# Patient Record
Sex: FEMALE | Race: WHITE | NOT HISPANIC OR LATINO | Employment: OTHER
[De-identification: names, ages, dates, MRNs, and addresses within clinical notes are randomized per-mention and may not be internally consistent; named-entity substitution may affect disease eponyms.]

---

## 2017-06-03 ENCOUNTER — HEALTH MAINTENANCE LETTER (OUTPATIENT)
Age: 62
End: 2017-06-03

## 2017-09-08 ENCOUNTER — OFFICE VISIT (OUTPATIENT)
Dept: OBGYN | Facility: CLINIC | Age: 62
End: 2017-09-08
Payer: COMMERCIAL

## 2017-09-08 VITALS
WEIGHT: 205 LBS | SYSTOLIC BLOOD PRESSURE: 120 MMHG | HEIGHT: 65 IN | DIASTOLIC BLOOD PRESSURE: 84 MMHG | BODY MASS INDEX: 34.16 KG/M2

## 2017-09-08 DIAGNOSIS — Z01.419 ENCOUNTER FOR GYNECOLOGICAL EXAMINATION WITHOUT ABNORMAL FINDING: Primary | ICD-10-CM

## 2017-09-08 DIAGNOSIS — Z23 NEED FOR PROPHYLACTIC VACCINATION AND INOCULATION AGAINST INFLUENZA: ICD-10-CM

## 2017-09-08 DIAGNOSIS — Z85.42 HISTORY OF ENDOMETRIAL CANCER: ICD-10-CM

## 2017-09-08 PROCEDURE — 90686 IIV4 VACC NO PRSV 0.5 ML IM: CPT | Performed by: OBSTETRICS & GYNECOLOGY

## 2017-09-08 PROCEDURE — 90471 IMMUNIZATION ADMIN: CPT | Performed by: OBSTETRICS & GYNECOLOGY

## 2017-09-08 PROCEDURE — 99396 PREV VISIT EST AGE 40-64: CPT | Mod: 25 | Performed by: OBSTETRICS & GYNECOLOGY

## 2017-09-08 RX ORDER — POTASSIUM CHLORIDE 1500 MG/1
20 TABLET, EXTENDED RELEASE ORAL
COMMUNITY
Start: 2016-03-09

## 2017-09-08 ASSESSMENT — ANXIETY QUESTIONNAIRES
7. FEELING AFRAID AS IF SOMETHING AWFUL MIGHT HAPPEN: NOT AT ALL
6. BECOMING EASILY ANNOYED OR IRRITABLE: NOT AT ALL
1. FEELING NERVOUS, ANXIOUS, OR ON EDGE: NOT AT ALL
IF YOU CHECKED OFF ANY PROBLEMS ON THIS QUESTIONNAIRE, HOW DIFFICULT HAVE THESE PROBLEMS MADE IT FOR YOU TO DO YOUR WORK, TAKE CARE OF THINGS AT HOME, OR GET ALONG WITH OTHER PEOPLE: NOT DIFFICULT AT ALL
2. NOT BEING ABLE TO STOP OR CONTROL WORRYING: NOT AT ALL
GAD7 TOTAL SCORE: 0
5. BEING SO RESTLESS THAT IT IS HARD TO SIT STILL: NOT AT ALL
3. WORRYING TOO MUCH ABOUT DIFFERENT THINGS: NOT AT ALL

## 2017-09-08 ASSESSMENT — PATIENT HEALTH QUESTIONNAIRE - PHQ9
5. POOR APPETITE OR OVEREATING: NOT AT ALL
SUM OF ALL RESPONSES TO PHQ QUESTIONS 1-9: 0

## 2017-09-08 NOTE — PROGRESS NOTES
Farhana is a 62 year old  female who presents for annual exam.     Besides routine health maintenance, she has no other health concerns today .    HPI:  The patient's PCP is FREEDOM NICOLAS MD  Patient has a PCP that she is following up with for her lipids and her HTN  Continues to gain weight and is up about 10-15# in the last 1-2 years. Knows she needs to watch her diet and exercise but just can't seem to and struggles. 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    Had endometrial cancer a few years back. Last pap was almost 5 yrs ago. Wasn't sure if needed paps anymore. Not seeing onc anymore as they released her from their care a couple years ago. Had abnormal paps in her 20s but CIN1-2 and had a cone then with normal paps since. Has not had any vaginal bleeding, abnormal d/c or vaginal complaints at all. No urinary sx. Patient's mom with serous endometrial cancer in her 80s. No genetic testing done. No colon cancer on maternal side but did in pgm.     for years. X is now with a male partner and they are amicable. 2 children. Daughter has 2 boys. Son is (?kids)    dexa about 3-4 years ago with normal T scores.      GYNECOLOGIC HISTORY:    No LMP recorded. Patient has had a hysterectomy.  Her current contraception method is: hysterectomy.  She  reports that she has never smoked. She does not have any smokeless tobacco history on file.      Patient is sexually active.  STD testing offered?  Declined  Last PHQ-9 score on record =   PHQ-9 SCORE 2017   Total Score 0     Last GAD7 score on record =   BOO-7 SCORE 2017   Total Score 0     Alcohol Score = 2    HEALTH MAINTENANCE:  Cholesterol: Done at PCP 17:    262,150, 56, 4.68, 176, 206  Last Mammo:10/2016  Result: normal, Next Mammo: october  Pap: 13 WNL HPV Neg  Colonoscopy:  1/10/13, Result: normal, Next Colonoscopy:   Dexa: 14 T scores -0.6 of hips and spine    Health  maintenance updated:  yes    HISTORY:  Obstetric History       T2      L2     SAB0   TAB0   Ectopic0   Multiple0   Live Births0       # Outcome Date GA Lbr Doe/2nd Weight Sex Delivery Anes PTL Lv   2 Term            1 Term                   Patient Active Problem List   Diagnosis     History of endometrial cancer     Hypertension, essential     Gastroesophageal reflux disease, esophagitis presence not specified     Past Surgical History:   Procedure Laterality Date     Basal Cell carcinoma of skin excision       COLONOSCOPY  1/10/2013    Procedure: COLONOSCOPY;  COLONOSCOPY;  Surgeon: Heather Brown MD;  Location:  GI     CONIZATION      ROSENDO I or II     HCL SQUAMOUS CELL CARCINOMA AG      eyelid     HYSTERECTOMY  07      Social History   Substance Use Topics     Smoking status: Never Smoker     Smokeless tobacco: Not on file     Alcohol use No      Problem (# of Occurrences) Relation (Name,Age of Onset)    Alzheimer Disease (1) Father    Colon Cancer (1) Paternal Grandmother: rectal    Coronary Artery Disease (1) Mother: Afib    Fractures (1) Maternal Grandmother: hip    Gynecology (1) Mother    Hyperlipidemia (2) Mother, Father    Hypertension (2) Mother, Brother    OSTEOPOROSIS (1) Maternal Grandmother    Uterine Cancer (1) Mother (83): serous type            Current Outpatient Prescriptions   Medication Sig     potassium chloride SA (K-DUR/KLOR-CON M) 20 MEQ CR tablet Take 20 mEq by mouth     omeprazole (PRILOSEC) 20 MG capsule Take 1 capsule (20 mg) by mouth daily     triamterene-hydrochlorothiazide (MAXZIDE-25) 37.5-25 MG per tablet Take 1 tablet by mouth daily.     LISINOPRIL PO Take 2.5 mg by mouth.     No current facility-administered medications for this visit.      Allergies   Allergen Reactions     Septra [Sulfamethoxazole W/Trimethoprim]      Sulfa Drugs Hives       Past medical, surgical, social and family histories were reviewed and updated in EPIC.    ROS:   12  "point review of systems negative other than symptoms noted below.  Musculoskeletal: Joint Pain    EXAM:  /84  Ht 5' 4.5\" (1.638 m)  Wt 205 lb (93 kg)  BMI 34.64 kg/m2   BMI: Body mass index is 34.64 kg/(m^2).    PHYSICAL EXAM:  Constitutional:  Appearance: Well nourished, well developed, alert, in no acute distress  Neck:  Lymph Nodes:  No lymphadenopathy present    Thyroid:  Gland size normal, nontender, no nodules or masses present  on palpation  Chest:  Respiratory Effort:  Breathing unlabored  Cardiovascular:    Heart: Auscultation:  Regular rate, normal rhythm, no murmurs present  Breasts: Palpation of Breasts and Axillae:  No masses present on palpation, no breast tenderness. and No nodularity, asymmetry or nipple discharge bilaterally.  Gastrointestinal:   Abdominal Examination:  Abdomen nontender to palpation, tone normal without rigidity or guarding, no masses present, umbilicus without lesions   Liver and Spleen:  No hepatomegaly present, liver nontender to palpation    Hernias:  No hernias present  Lymphatic: Lymph Nodes:  No other lymphadenopathy present  Skin:  General Inspection:  No rashes present, no lesions present, no areas of  discoloration    Genitalia and Groin:  No rashes present, no lesions present, no areas of  discoloration, no masses present  Neurologic/Psychiatric:    Mental Status:  Oriented X3     Pelvic Exam:  External Genitalia:     Normal appearance for age, no discharge present, no tenderness present, no inflammatory lesions present, color normal  Vagina:     Normal vaginal vault without central or paravaginal defects, no discharge present, no inflammatory lesions present, no masses present  Bladder:     Nontender to palpation  Urethra:   Urethral Body:  Urethra palpation normal, urethra structural support normal   Urethral Meatus:  No erythema or lesions present  Cervix:     Surgically absent  Uterus:     Surgically absent  Adnexa:     Surgically absent  Perineum:  "    Perineum within normal limits, no evidence of trauma, no rashes or skin lesions present  Anus:     Anus within normal limits, no hemorrhoids present  Inguinal Lymph Nodes:     No lymphadenopathy present      COUNSELING:   Reviewed preventive health counseling, as reflected in patient instructions    BMI: Body mass index is 34.64 kg/(m^2).  Weight management plan: Discussed healthy diet and exercise guidelines and patient will follow up in 12 months in clinic to re-evaluate. Patient was referred to their PCP to discuss a diet and exercise plan.    ASSESSMENT:  62 year old female with satisfactory annual exam.    ICD-10-CM    1. Encounter for gynecological examination without abnormal finding Z01.419 Vaccine Administration, Initial [34955]   2. History of endometrial cancer Z85.42    3. Need for prophylactic vaccination and inoculation against influenza Z23 FLU VAC, SPLIT VIRUS IM > 3 YO (QUADRIVALENT) [43964]     Vaccine Administration, Initial [77337]       PLAN:  Pap is likely no longer indicated though oncology can have different opinions on this and at times due still recommend a pap as endometrial cancer recurrence is most likely at the cuff. Will likely repeat one more pap next year at 7 yrs and then just proceed with normal pelvic exam/spec exam for palpation and visualization w/o paps.  Patient is getting mammo in October. Encouraged to do at  [facility] [place] at time of our appointment so can do it through us and she will do that next year.  Dexa not needed for another several years based on scores.  Encouraged diet, exericse, weight loss.  Flu shot today    Yvette Bentley MD  Injectable Influenza Immunization Documentation    1.  Are you sick today? (Fever of 100.5 or higher on the day of the clinic)   No    2.  Have you ever had Guillain-[place] Syndrome within 6 weeks of an influenza vaccionation?  No    3. Do you have a life-threatening allergy to eggs?  No    4. Do you have a life-threatening allergy to  a component of the vaccine? May include antibiotics, gelatin or latex.  No     5. Have you ever had a reaction to a dose of flu vaccine that needed immediate medical attention?  No     Form completed by Dorene Stevens MA

## 2017-09-08 NOTE — MR AVS SNAPSHOT
"              After Visit Summary   9/8/2017    Farhana Michael    MRN: [id number]           Patient Information     Date Of Birth          1955        Visit Information        Provider Department      9/8/2017 10:50 AM Yvette Bentley MD [facility]        Today's Diagnoses     Encounter for gynecological examination without abnormal finding    -  1    History of endometrial cancer        Need for prophylactic vaccination and inoculation against influenza           Follow-ups after your visit        Who to contact     If you have questions or need follow up information about today's clinic visit or your schedule please contact [facility] directly at [phone].  Normal or non-critical lab and imaging results will be communicated to you by MyFront[employer]ps[employer], letter or phone within 4 business days after the clinic has received the results. If you do not hear from us within 7 days, please contact the clinic through [employer] or phone. If you have a critical or abnormal lab result, we will notify you by phone as soon as possible.  Submit refill requests through [employer] or call your pharmacy and they will forward the refill request to us. Please allow 3 business days for your refill to be completed.          Additional Information About Your Visit        MyChart Information     [employer] gives you secure access to your electronic health record. If you see a primary care provider, you can also send messages to your care team and make appointments. If you have questions, please call your primary care clinic.  If you do not have a primary care provider, please call [phone] and they will assist you.        Care EveryWhere ID     This is your Care EveryWhere ID. This could be used by other organizations to access your [facility] medical records  [id number]        Your Vitals Were     Height BMI (Body Mass Index)                5' 4.5\" (1.638 m) 34.64 kg/m2           Blood Pressure from " Last 3 Encounters:   09/08/17 120/84   03/02/16 122/84   03/21/14 120/82    Weight from Last 3 Encounters:   09/08/17 205 lb (93 kg)   03/02/16 191 lb (86.6 kg)   03/21/14 195 lb (88.5 kg)              We Performed the Following     FLU VAC, SPLIT VIRUS IM > 3 YO (QUADRIVALENT) [47076]     Vaccine Administration, Initial [39381]        Primary Care Provider Office Phone # Fax #    Dominga Cheema [phone]       [facility] [address] [facility] [address]        Equal Access to Services     [facility] : Hadii sim Alicea, waniharikada gordon, qaybta kaalmada marge, yoana miller . So [facility] [phone].    ATENCIÓN: Si habla español, tiene a wolf disposición servicios gratuitos de asistencia lingüística. [facility] [phone].    We comply with applicable federal civil rights laws and [place] laws. We do not discriminate on the basis of race, color, national origin, age, disability sex, sexual orientation or gender identity.            Thank you!     Thank you for choosing [facility]  for your care. Our goal is always to provide you with excellent care. Hearing back from our patients is one way we can continue to improve our services. Please take a few minutes to complete the written survey that you may receive in the mail after your visit with us. Thank you!             Your Updated Medication List - Protect others around you: Learn how to safely use, store and throw away your medicines at www.disposemymeds.org.          This list is accurate as of: 9/8/17 11:59 PM.  Always use your most recent med list.                   Brand Name Dispense Instructions for use Diagnosis    LISINOPRIL PO      Take 2.5 mg by mouth.        omeprazole 20 MG CR capsule    priLOSEC    30 capsule    Take 1 capsule (20 mg) by mouth daily        potassium chloride SA 20 MEQ CR tablet    K-DUR/KLOR-CON M     Take 20 mEq by mouth         triamterene-hydrochlorothiazide 37.5-25 MG per tablet    MAXZIDE-25     Take 1 tablet by mouth daily.

## 2017-09-09 ASSESSMENT — ANXIETY QUESTIONNAIRES: GAD7 TOTAL SCORE: 0

## 2017-11-06 ENCOUNTER — TRANSFERRED RECORDS (OUTPATIENT)
Dept: HEALTH INFORMATION MANAGEMENT | Facility: CLINIC | Age: 62
End: 2017-11-06

## 2018-02-08 ENCOUNTER — HOSPITAL ENCOUNTER (OUTPATIENT)
Facility: CLINIC | Age: 63
Discharge: HOME | End: 2018-02-08
Attending: COLON & RECTAL SURGERY | Admitting: COLON & RECTAL SURGERY
Payer: COMMERCIAL

## 2018-02-08 ENCOUNTER — SURGERY (OUTPATIENT)
Age: 63
End: 2018-02-08

## 2018-02-08 VITALS
HEIGHT: 65 IN | SYSTOLIC BLOOD PRESSURE: 107 MMHG | OXYGEN SATURATION: 94 % | BODY MASS INDEX: 30.82 KG/M2 | DIASTOLIC BLOOD PRESSURE: 77 MMHG | RESPIRATION RATE: 16 BRPM | WEIGHT: 185 LBS

## 2018-02-08 LAB — COLONOSCOPY: NORMAL

## 2018-02-08 PROCEDURE — G0500 MOD SEDAT ENDO SERVICE >5YRS: HCPCS | Performed by: COLON & RECTAL SURGERY

## 2018-02-08 PROCEDURE — G0105 COLORECTAL SCRN; HI RISK IND: HCPCS | Performed by: COLON & RECTAL SURGERY

## 2018-02-08 PROCEDURE — 25000128 H RX IP 250 OP 636: Performed by: COLON & RECTAL SURGERY

## 2018-02-08 PROCEDURE — 45378 DIAGNOSTIC COLONOSCOPY: CPT | Performed by: COLON & RECTAL SURGERY

## 2018-02-08 RX ORDER — FLUMAZENIL 0.1 MG/ML
0.2 INJECTION, SOLUTION INTRAVENOUS
Status: DISCONTINUED | OUTPATIENT
Start: 2018-02-08 | End: 2018-02-08 | Stop reason: HOSPADM

## 2018-02-08 RX ORDER — ONDANSETRON 2 MG/ML
4 INJECTION INTRAMUSCULAR; INTRAVENOUS
Status: DISCONTINUED | OUTPATIENT
Start: 2018-02-08 | End: 2018-02-08 | Stop reason: HOSPADM

## 2018-02-08 RX ORDER — ONDANSETRON 2 MG/ML
4 INJECTION INTRAMUSCULAR; INTRAVENOUS EVERY 6 HOURS PRN
Status: DISCONTINUED | OUTPATIENT
Start: 2018-02-08 | End: 2018-02-08 | Stop reason: HOSPADM

## 2018-02-08 RX ORDER — ONDANSETRON 4 MG/1
4 TABLET, ORALLY DISINTEGRATING ORAL EVERY 6 HOURS PRN
Status: DISCONTINUED | OUTPATIENT
Start: 2018-02-08 | End: 2018-02-08 | Stop reason: HOSPADM

## 2018-02-08 RX ORDER — FENTANYL CITRATE 50 UG/ML
INJECTION, SOLUTION INTRAMUSCULAR; INTRAVENOUS PRN
Status: DISCONTINUED | OUTPATIENT
Start: 2018-02-08 | End: 2018-02-08 | Stop reason: HOSPADM

## 2018-02-08 RX ORDER — NALOXONE HYDROCHLORIDE 0.4 MG/ML
.1-.4 INJECTION, SOLUTION INTRAMUSCULAR; INTRAVENOUS; SUBCUTANEOUS
Status: DISCONTINUED | OUTPATIENT
Start: 2018-02-08 | End: 2018-02-08 | Stop reason: HOSPADM

## 2018-02-08 RX ORDER — LIDOCAINE 40 MG/G
CREAM TOPICAL
Status: DISCONTINUED | OUTPATIENT
Start: 2018-02-08 | End: 2018-02-08 | Stop reason: HOSPADM

## 2018-02-08 RX ADMIN — FENTANYL CITRATE 100 MCG: 50 INJECTION, SOLUTION INTRAMUSCULAR; INTRAVENOUS at 14:10

## 2018-02-08 RX ADMIN — FENTANYL CITRATE 100 MCG: 50 INJECTION, SOLUTION INTRAMUSCULAR; INTRAVENOUS at 14:05

## 2018-02-08 RX ADMIN — MIDAZOLAM 2 MG: 1 INJECTION INTRAMUSCULAR; INTRAVENOUS at 14:05

## 2018-02-08 NOTE — BRIEF OP NOTE
[facility] Brief Operative Note    Pre-operative diagnosis: HIGH RISK COLONOSCOPY   Post-operative diagnosis normal colonoscopy   Procedure: Procedure(s):  COLONOSCOPY  - Wound Class: II-Clean Contaminated   Surgeon(s): Surgeon(s) and Role:     * Jean Carlos Bhakta MD - Primary   Estimated blood loss: * No values recorded between 2/8/2018 12:00 AM and 2/8/2018  2:25 PM *    Specimens: * No specimens in log *   Findings: normal colonoscopy  See provation procedure note in chart review.    Jean Carlos Bhakta MD  [facility]  [phone]

## 2018-02-08 NOTE — H&P
[facility]    History and Physical  Colon and Rectal Surgery     Date of Admission:  2/8/2018      Assessment & Plan   Farhana Michael is a 62 year old female who presents for colonoscopy.    Indication: family history of polyps and rectal cancer (grandmother)  Plan for Colonoscopy with possible biopsy, possible polypectomy. We discussed the risks, benefits and alternatives and the patient wished to proceed.    The above has been forwarded to the consulting provider.      Jean Carlos Bhakta MD  [facility], [facility]  [phone]        Code Status   Full Code    Primary Care Physician   FREEDOM NICOLAS      History is obtained from the patient    History of Present Illness   Farhana Michael is a 62 year old female who presents with family history of polyps and rectal cancer (grandmother)    Past Medical History    I have reviewed this patient's medical history and updated it with pertinent information if needed.   Past Medical History:   Diagnosis Date     Endometrial cancer (H) 2007    IB grade 3 endometrial ca. had LAVH/BSO and LND. then did one month of brachytherapy. tested for Ag syndrome and negative     GERD (gastroesophageal reflux disease)      Hernia, hiatal, congenital 03/21/2014     Hypertension, essential     Dr. Sophie Nicolas at [facility]        Past Surgical History   I have reviewed this patient's surgical history and updated it with pertinent information if needed.  Past Surgical History:   Procedure Laterality Date     Basal Cell carcinoma of skin excision  2009     COLONOSCOPY  1/10/2013    Procedure: COLONOSCOPY;  COLONOSCOPY;  Surgeon: Heather Brown MD;  Location:  GI     CONIZATION  1979    ROSENDO I or II     HCL SQUAMOUS CELL CARCINOMA AG  2010    eyelid     HYSTERECTOMY  1/5/07       Prior to Admission Medications   Prior to Admission Medications   Prescriptions Last Dose Informant Patient Reported? Taking?   LISINOPRIL PO 2/7/2018  Yes  Yes   Sig: Take 2.5 mg by mouth.   metroNIDAZOLE (METROCREAM) 0.75 % cream 2/7/2018  Yes Yes   Sig: Apply topically 2 times daily   potassium chloride SA (K-DUR/KLOR-CON M) 20 MEQ CR tablet 2/7/2018  Yes Yes   Sig: Take 20 mEq by mouth   triamterene-hydrochlorothiazide (MAXZIDE-25) 37.5-25 MG per tablet 2/7/2018  Yes Yes   Sig: Take 1 tablet by mouth daily.      Facility-Administered Medications: None     Allergies   Allergies   Allergen Reactions     Septra [Sulfamethoxazole W/Trimethoprim]      Sulfa Drugs Hives       Social History   I have reviewed this patient's social history and updated it with pertinent information if needed. Farhana Michael  reports that she has never smoked. She has never used smokeless tobacco. She reports that she does not drink alcohol or use illicit drugs.    Family History   I have reviewed this patient's family history and updated it with pertinent information if needed.   Family History   Problem Relation Age of Onset     Gynecology Mother      Hyperlipidemia Mother      Hypertension Mother      Coronary Artery Disease Mother      Afib     Uterine Cancer Mother 83     serous type     Alzheimer Disease Father      Hyperlipidemia Father      Colon Cancer Paternal Grandmother      rectal     Fractures Maternal Grandmother      hip     OSTEOPOROSIS Maternal Grandmother      Hypertension Brother        Review of Systems   C: NEGATIVE for fever, chills, change in weight  E/M: NEGATIVE for ear, mouth and throat problems  R: NEGATIVE for significant cough or SOB  CV: NEGATIVE for chest pain, palpitations or peripheral edema    Physical Exam       BP: 136/88   Heart Rate: 78 Resp: 18 SpO2: 97 % O2 Device: None (Room air)    Vital Signs with Ranges  Heart Rate:  [78] 78  Resp:  [18] 18  BP: (136)/(88) 136/88  SpO2:  [97 %] 97 %  185 lbs 0 oz    Constitutional: awake, alert, cooperative, no apparent distress, and appears stated age  AIRWAY EXAM: Mallampatti Class I (visualization of the soft  palate, fauces, uvula, anterior and posterior pillars)  Respiratory: No increased work of breathing, good air exchange, clear to auscultation bilaterally, no crackles or wheezing  Cardiovascular: Normal apical impulse, regular rate and rhythm, normal S1 and S2, no S3 or S4, and no murmur noted  ASA Class: 2 - Mild systemic disease

## 2018-03-31 ENCOUNTER — HEALTH MAINTENANCE LETTER (OUTPATIENT)
Age: 63
End: 2018-03-31

## 2019-09-29 ENCOUNTER — HEALTH MAINTENANCE LETTER (OUTPATIENT)
Age: 64
End: 2019-09-29

## 2020-03-15 ENCOUNTER — HEALTH MAINTENANCE LETTER (OUTPATIENT)
Age: 65
End: 2020-03-15

## 2021-01-14 ENCOUNTER — HEALTH MAINTENANCE LETTER (OUTPATIENT)
Age: 66
End: 2021-01-14

## 2021-05-30 ENCOUNTER — RECORDS - HEALTHEAST (OUTPATIENT)
Dept: ADMINISTRATIVE | Facility: CLINIC | Age: 66
End: 2021-05-30

## 2021-10-24 ENCOUNTER — HEALTH MAINTENANCE LETTER (OUTPATIENT)
Age: 66
End: 2021-10-24

## 2022-02-13 ENCOUNTER — HEALTH MAINTENANCE LETTER (OUTPATIENT)
Age: 67
End: 2022-02-13

## 2022-04-10 ENCOUNTER — HEALTH MAINTENANCE LETTER (OUTPATIENT)
Age: 67
End: 2022-04-10

## 2022-10-15 ENCOUNTER — HEALTH MAINTENANCE LETTER (OUTPATIENT)
Age: 67
End: 2022-10-15

## 2023-10-29 ENCOUNTER — HEALTH MAINTENANCE LETTER (OUTPATIENT)
Age: 68
End: 2023-10-29

## 2024-03-19 PROBLEM — C54.1 MALIGNANT NEOPLASM OF ENDOMETRIUM (H): Status: ACTIVE | Noted: 2017-08-09

## 2024-03-19 PROBLEM — K58.9 IRRITABLE BOWEL SYNDROME: Status: ACTIVE | Noted: 2024-03-19

## 2024-03-19 NOTE — PROGRESS NOTES
SUBJECTIVE:                                                   Farhana Michael is a 68 year old female who presents to clinic today for the following health issue(s):  Patient presents with:  Gyn Exam      Additional information: cyst     HPI:  The patient's PCP is FREEDOM NICOLAS MD    Patient hasn't seen me since 2017 b/c her insurance changed and she had to go to a different clinic. Was seeing a gyn in our [facility] but not sure on the name.  Insurance just changed and she can come back to see us again.  Her mom worked for [employer] for years and always really liked it here so happy to come back  Was doing annual mammo at [facility] since being here last and having one 2017 but does have her mammo here today    Menopause in 2007 when she had her LAVH/BSO for endomtrial CA. Surgical menopause at that point. Never on HRT  Had bad lymphedema in her legs bilaterally and especially in her pelvis and groin after that and never able to adequately manage it  Would try to walk or be active and her thighs and pelvis and vulva would be really heavy and achy and painful  Finally found a company called [employer] and a full leg and almost like a hockey breezer shorts lymphedema pump called flexitouche and it's been great.  1 hr a day and can do it easily and has noticed a lot of improvement    Had a dexa in 2022, no records seen but shows as an order in care everywhere. Per patient report it was normal. Last was 2014 and normal x3  She had a c.s 3/23 and per her report it was normal and done at [facility]. Recommended to do q5 yrs b/c of her endometrial cancer. She was tested for west syndrome and negative. Last c.s was at [facility] 2/18 and normal as well  Patient's mom with serous endometrial cancer in her 80s. No genetic testing done. No colon cancer on maternal side but did in pgm.      Had abnormal paps in her 20s but CIN1-2 and had a cone then, and all normal paps up until having her hyst     Reported what she thinks is a cyst on right  labia. denied pain, but notices when she showers and wipes and with her history just makes her nervous that she has these bumps and so just wants it checked.    Patient had recent hip XRay and it's showing fairly severe right hip arthritis. Definitely discussing hip replacement just not sure when she'd be ready to do that but definitely a lot of pain from it     for years.  Has 4 grandsons, 8 and 10 from her daughter and 4 and 5 from her son    No LMP recorded. Patient has had a hysterectomy..     Patient is not sexually active, .  Using hysterectomy for contraception.    reports that she has never smoked. She has never used smokeless tobacco.    STD testing offered?  Declined - not sexually active    Health maintenance updated:  colon, fall    Today's PHQ-2 Score:       3/22/2024     3:59 PM   PHQ-2 (  [employer])   Q1: Little interest or pleasure in doing things 0   Q2: Feeling down, depressed or hopeless 0   PHQ-2 Score 0     Today's PHQ-9 Score:       3/22/2024     3:59 PM   PHQ-9 SCORE   PHQ-9 Total Score 0     Today's BOO-7 Score:       3/22/2024     3:59 PM   BOO-7 SCORE   Total Score 2       Problem list and histories reviewed & adjusted, as indicated.  Additional history: as documented.    Patient Active Problem List   Diagnosis    History of endometrial cancer    Hypertension, essential    Gastroesophageal reflux disease, esophagitis presence not specified    Malignant neoplasm of endometrium (H)    Malaise and fatigue    Lymphedema    Irritable bowel syndrome    Constipation    Anxiety state    Pure hypercholesterolemia    Impaired fasting glucose    Hyperlipidemia    Class 2 severe obesity due to excess calories with serious comorbidity in adult (H)     Past Surgical History:   Procedure Laterality Date    Basal Cell carcinoma of skin excision      COLONOSCOPY  1/10/2013    Procedure: COLONOSCOPY;  COLONOSCOPY;  Surgeon: Heather Brown MD;  Location:  GI    COLONOSCOPY N/A  "2/8/2018    Procedure: COLONOSCOPY;  COLONOSCOPY ;  Surgeon: Jean Carlos Bhakta MD;  Location:  GI    CONIZATION  1979    ROSENDO I or II    HCL SQUAMOUS CELL CARCINOMA AG  2010    eyelid    HYSTERECTOMY  1/5/07      Social History     Tobacco Use    Smoking status: Never    Smokeless tobacco: Never   Substance Use Topics    Alcohol use: No     Alcohol/week: 0.0 standard drinks of alcohol      Problem (# of Occurrences) Relation (Name,Age of Onset)    Alzheimer Disease (1) Father    Gynecology (1) Mother    Hypertension (2) Mother, Brother    Osteoporosis (1) Maternal Grandmother    Hyperlipidemia (2) Mother, Father    Coronary Artery Disease (1) Mother: Afib    Colon Cancer (1) Paternal Grandmother: rectal    Fractures (1) Maternal Grandmother: hip    Uterine Cancer (1) Mother (83): serous type              Current Outpatient Medications   Medication Sig    clindamycin (CLEOCIN T) 1 % external lotion APPLY TOPICALLY TO FACE DAILY    lisinopril (ZESTRIL) 2.5 MG tablet Take 1 tablet by mouth daily at 2 pm    metroNIDAZOLE (METROCREAM) 0.75 % cream Apply topically 2 times daily    omeprazole (PRILOSEC) 20 MG DR capsule TAKE 1 CAPSULE BY MOUTH DAILY AS NEEDED FOR STOMACH ACID OR REFLUX    potassium chloride SA (K-DUR/KLOR-CON M) 20 MEQ CR tablet Take 20 mEq by mouth    prednisoLONE acetate (PRED FORTE) 1 % ophthalmic suspension     triamterene-hydrochlorothiazide (MAXZIDE-25) 37.5-25 MG per tablet Take 1 tablet by mouth daily.     No current facility-administered medications for this visit.     Allergies   Allergen Reactions    Septra [Sulfamethoxazole-Trimethoprim]     Sulfa Antibiotics Hives     OBJECTIVE:     /80   Ht 1.626 m (5' 4\")   Wt 97.9 kg (215 lb 12.8 oz)   Breastfeeding No   BMI 37.04 kg/m    Body mass index is 37.04 kg/m .    Exam:  Constitutional:  Appearance: Well nourished, well developed alert, in no acute distress  Neck:  Lymph Nodes:  No lymphadenopathy present; Thyroid:  Gland size " normal, nontender, no nodules or masses present on palpation    Breasts:  Inspection of Breasts:  Symmetric bilaterally.  No puckering.  No skin changes.  Palpation of Breasts and Axillae:  No masses present on palpation, no breast tenderness Axillary Lymph Nodes:  No lymphadenopathy present  Gastrointestinal:  Abdominal Examination:  Abdomen nontender to palpation, tone normal without rigidity or guarding BUT VERY TAUT AND ROTUND SUPERIORLY, no masses present, umbilicus without lesions; Liver/Spleen:  No hepatomegaly present, liver nontender to palpation; Hernias:  No hernias present  Lymphatic: Lymph Nodes:  No other lymphadenopathy present  Skin: General Inspection:  No rashes present, no lesions present, no areas of discoloration.  Neurologic:  Mental Status:  Oriented X3.  Normal strength and tone, sensory exam grossly normal, mentation intact and speech normal.    Psychiatric:  Mentation appears normal and affect normal/bright.  Pelvic Exam:  External Genitalia:     Normal appearance for age, no discharge present, no tenderness present, no inflammatory lesions present, color normal, HAS 2 ABUTTING SEB CYSTS ON THE INNER RIGHT LABIA MAJORA THAT NEXT TO EACH OTHER ARE ABOUT 1CM ACROSS, HAS ANOTHER 2 TINY SEB CYSTS, BOTH DEEPER IN ON THE RIGHT LABIA, ONE SUPERIORLY AND ONE INFERIORLY THAT ARE ABOUT A GRAIN OF RICE SIZE  Vagina:     SHORTENED BUT Normal vaginal vault without central or paravaginal defects, no discharge present, no inflammatory lesions present, ATROPHIC  Bladder:     Nontender to palpation  Urethra:   Urethral Body:  Urethra palpation normal, urethra structural support normal   Urethral Meatus:  No erythema or lesions present  Cervix:    SURGICALLY ABSENT  Uterus:     Surgically absent  Adnexa:     Surgically absent  Perineum:     Perineum within normal limits, no evidence of trauma, no rashes or skin lesions present  Anus:     Anus within normal limits, no hemorrhoids present  Inguinal Lymph  Nodes:     No lymphadenopathy present  Pubic Hair:     Normal pubic hair distribution for age  Genitalia and Groin:     No rashes present, no lesions present, no areas of discoloration, no masses present     In-Clinic Test Results:  No results found for this or any previous visit (from the past 24 hour(s)).    ASSESSMENT/PLAN:                                                        ICD-10-CM    1. History of endometrial cancer  Z85.42       2. Class 2 severe obesity due to excess calories with serious comorbidity and body mass index (BMI) of 37.0 to 37.9 in adult (H)  E66.01     Z68.37       3. Lymphedema  I89.0       4. Encounter for gynecological examination without abnormal finding  Z01.419 CERV/VAG CANC SCRN,PELV/BREAST EXAM          No more paps d/t hysterectomy and age and her hyst was for endometrial cancer  However would continue to do annual pelvic and breast exams     Mammo today    Fasting labs and mgmt of her HTN and lipids and GERD deferred to PCP.     Additional health issues addressed at today's visit include:    Discussed her lymphedema which is not obvious to me on exam but she is subjectively doing better with the pump machine she found which is great    Dexa is UTD and reviewed taht despite her fhx of osteoporosis she has been surgically menopausal for 15 yrs and has had all normal T scores w/o osteopenia even.  Discussed that arthritis can falsely elevate scores so knowing she has bad arthritis on the right hip may mean that all the scores are falsely elevated but nothing of note that would need intervention for at this time    UTD on c.s and continue q5 yrs due to endometrial cancer and fhx    Reviewed her sebaceous cysts and that they are completely benign, do not convert to anything other than what they are and will never be cancer, nor are they remotely suspicious for a recurrence or a cancer at all  They don't tend to drain on own b/c of consistency being more chalky/waxy but excising them  makes an open wound in an area of bacteria and moisture and friction and increases pain and infection risk needlessly  Would not recommend excising them unless grow or cause pain or discomfort and patient is agreeable with this    30 minutes in addition to the medicare limited breast/pelvic exam  were spent on direct management of the patient's other medical issues as above as well as chart review including: imaging, lab work, previous visit notes by this provider, and other provider notes, as well as chart completion on the same DOS      Yvette Bentley MD  [facility]

## 2024-03-22 ENCOUNTER — ANCILLARY PROCEDURE (OUTPATIENT)
Dept: MAMMOGRAPHY | Facility: CLINIC | Age: 69
End: 2024-03-22
Payer: COMMERCIAL

## 2024-03-22 ENCOUNTER — OFFICE VISIT (OUTPATIENT)
Dept: OBGYN | Facility: CLINIC | Age: 69
End: 2024-03-22
Payer: COMMERCIAL

## 2024-03-22 VITALS
SYSTOLIC BLOOD PRESSURE: 120 MMHG | WEIGHT: 215.8 LBS | BODY MASS INDEX: 36.84 KG/M2 | DIASTOLIC BLOOD PRESSURE: 80 MMHG | HEIGHT: 64 IN

## 2024-03-22 DIAGNOSIS — E66.812 CLASS 2 SEVERE OBESITY DUE TO EXCESS CALORIES WITH SERIOUS COMORBIDITY AND BODY MASS INDEX (BMI) OF 37.0 TO 37.9 IN ADULT (H): ICD-10-CM

## 2024-03-22 DIAGNOSIS — Z01.419 ENCOUNTER FOR GYNECOLOGICAL EXAMINATION WITHOUT ABNORMAL FINDING: ICD-10-CM

## 2024-03-22 DIAGNOSIS — Z85.42 HISTORY OF ENDOMETRIAL CANCER: Primary | ICD-10-CM

## 2024-03-22 DIAGNOSIS — Z12.31 VISIT FOR SCREENING MAMMOGRAM: ICD-10-CM

## 2024-03-22 DIAGNOSIS — E66.01 CLASS 2 SEVERE OBESITY DUE TO EXCESS CALORIES WITH SERIOUS COMORBIDITY AND BODY MASS INDEX (BMI) OF 37.0 TO 37.9 IN ADULT (H): ICD-10-CM

## 2024-03-22 DIAGNOSIS — I89.0 LYMPHEDEMA: ICD-10-CM

## 2024-03-22 PROBLEM — R63.5 WEIGHT GAIN: Status: RESOLVED | Noted: 2022-02-24 | Resolved: 2024-03-22

## 2024-03-22 PROBLEM — R63.5 WEIGHT GAIN: Status: ACTIVE | Noted: 2022-02-24

## 2024-03-22 PROBLEM — E78.5 HYPERLIPIDEMIA: Status: ACTIVE | Noted: 2023-04-13

## 2024-03-22 PROBLEM — R73.01 IMPAIRED FASTING GLUCOSE: Status: ACTIVE | Noted: 2021-12-14

## 2024-03-22 PROBLEM — E78.00 PURE HYPERCHOLESTEROLEMIA: Status: ACTIVE | Noted: 2020-06-04

## 2024-03-22 PROCEDURE — G0101 CA SCREEN;PELVIC/BREAST EXAM: HCPCS | Performed by: OBSTETRICS & GYNECOLOGY

## 2024-03-22 PROCEDURE — 77063 BREAST TOMOSYNTHESIS BI: CPT | Mod: TC | Performed by: RADIOLOGY

## 2024-03-22 PROCEDURE — 77067 SCR MAMMO BI INCL CAD: CPT | Mod: TC | Performed by: RADIOLOGY

## 2024-03-22 PROCEDURE — 99459 PELVIC EXAMINATION: CPT | Performed by: OBSTETRICS & GYNECOLOGY

## 2024-03-22 PROCEDURE — 99203 OFFICE O/P NEW LOW 30 MIN: CPT | Mod: 25 | Performed by: OBSTETRICS & GYNECOLOGY

## 2024-03-22 RX ORDER — LISINOPRIL 2.5 MG/1
1 TABLET ORAL
COMMUNITY
Start: 2023-12-08

## 2024-03-22 RX ORDER — PREDNISOLONE ACETATE 10 MG/ML
SUSPENSION/ DROPS OPHTHALMIC
COMMUNITY
Start: 2023-12-06

## 2024-03-22 RX ORDER — CLINDAMYCIN PHOSPHATE 10 UG/ML
LOTION TOPICAL
COMMUNITY
Start: 2024-03-08

## 2024-03-22 ASSESSMENT — PATIENT HEALTH QUESTIONNAIRE - PHQ9
SUM OF ALL RESPONSES TO PHQ QUESTIONS 1-9: 0
5. POOR APPETITE OR OVEREATING: SEVERAL DAYS

## 2024-03-22 ASSESSMENT — ANXIETY QUESTIONNAIRES
7. FEELING AFRAID AS IF SOMETHING AWFUL MIGHT HAPPEN: NOT AT ALL
3. WORRYING TOO MUCH ABOUT DIFFERENT THINGS: NOT AT ALL
2. NOT BEING ABLE TO STOP OR CONTROL WORRYING: NOT AT ALL
1. FEELING NERVOUS, ANXIOUS, OR ON EDGE: SEVERAL DAYS
IF YOU CHECKED OFF ANY PROBLEMS ON THIS QUESTIONNAIRE, HOW DIFFICULT HAVE THESE PROBLEMS MADE IT FOR YOU TO DO YOUR WORK, TAKE CARE OF THINGS AT HOME, OR GET ALONG WITH OTHER PEOPLE: NOT DIFFICULT AT ALL
6. BECOMING EASILY ANNOYED OR IRRITABLE: NOT AT ALL
5. BEING SO RESTLESS THAT IT IS HARD TO SIT STILL: NOT AT ALL
GAD7 TOTAL SCORE: 2
GAD7 TOTAL SCORE: 2

## 2024-05-26 ENCOUNTER — HEALTH MAINTENANCE LETTER (OUTPATIENT)
Age: 69
End: 2024-05-26

## 2025-06-13 ENCOUNTER — ANCILLARY PROCEDURE (OUTPATIENT)
Dept: MAMMOGRAPHY | Facility: CLINIC | Age: 70
End: 2025-06-13
Attending: OBSTETRICS & GYNECOLOGY
Payer: COMMERCIAL

## 2025-06-13 DIAGNOSIS — Z12.31 VISIT FOR SCREENING MAMMOGRAM: ICD-10-CM

## 2025-06-13 PROCEDURE — 77067 SCR MAMMO BI INCL CAD: CPT | Mod: TC | Performed by: RADIOLOGY

## 2025-06-13 PROCEDURE — 77063 BREAST TOMOSYNTHESIS BI: CPT | Mod: TC | Performed by: RADIOLOGY

## 2025-06-16 ENCOUNTER — APPOINTMENT (OUTPATIENT)
Dept: CT IMAGING | Facility: CLINIC | Age: 70
DRG: 418 | End: 2025-06-16
Attending: EMERGENCY MEDICINE
Payer: COMMERCIAL

## 2025-06-16 ENCOUNTER — HOSPITAL ENCOUNTER (INPATIENT)
Facility: CLINIC | Age: 70
LOS: 2 days | Discharge: HOME | DRG: 418 | End: 2025-06-18
Attending: EMERGENCY MEDICINE | Admitting: STUDENT IN AN ORGANIZED HEALTH CARE EDUCATION/TRAINING PROGRAM
Payer: COMMERCIAL

## 2025-06-16 ENCOUNTER — APPOINTMENT (OUTPATIENT)
Dept: ULTRASOUND IMAGING | Facility: CLINIC | Age: 70
DRG: 418 | End: 2025-06-16
Attending: EMERGENCY MEDICINE
Payer: COMMERCIAL

## 2025-06-16 DIAGNOSIS — R11.2 NAUSEA AND VOMITING, UNSPECIFIED VOMITING TYPE: ICD-10-CM

## 2025-06-16 DIAGNOSIS — K81.0 ACUTE CHOLECYSTITIS: Primary | ICD-10-CM

## 2025-06-16 DIAGNOSIS — R10.10 PAIN OF UPPER ABDOMEN: ICD-10-CM

## 2025-06-16 DIAGNOSIS — N17.9 AKI (ACUTE KIDNEY INJURY): ICD-10-CM

## 2025-06-16 DIAGNOSIS — D72.829 LEUKOCYTOSIS, UNSPECIFIED TYPE: ICD-10-CM

## 2025-06-16 DIAGNOSIS — K82.9 GALLBLADDER PAIN: ICD-10-CM

## 2025-06-16 DIAGNOSIS — G89.18 ACUTE POST-OPERATIVE PAIN: ICD-10-CM

## 2025-06-16 LAB
ALBUMIN SERPL BCG-MCNC: 4 G/DL (ref 3.5–5.2)
ALBUMIN UR-MCNC: 30 MG/DL
ALP SERPL-CCNC: 136 U/L (ref 40–150)
ALT SERPL W P-5'-P-CCNC: 20 U/L (ref 0–50)
ANION GAP SERPL CALCULATED.3IONS-SCNC: 15 MMOL/L (ref 7–15)
APPEARANCE UR: ABNORMAL
AST SERPL W P-5'-P-CCNC: 18 U/L (ref 0–45)
BACTERIA #/AREA URNS HPF: ABNORMAL /HPF
BASE EXCESS BLDV CALC-SCNC: 5 MMOL/L (ref -3–3)
BILIRUB SERPL-MCNC: 0.6 MG/DL
BILIRUB UR QL STRIP: ABNORMAL
BUN SERPL-MCNC: 14.4 MG/DL (ref 8–23)
CALCIUM SERPL-MCNC: 9.7 MG/DL (ref 8.8–10.4)
CHLORIDE SERPL-SCNC: 89 MMOL/L (ref 98–107)
COLOR UR AUTO: ABNORMAL
CREAT SERPL-MCNC: 1.56 MG/DL (ref 0.51–0.95)
EGFRCR SERPLBLD CKD-EPI 2021: 36 ML/MIN/1.73M2
ERYTHROCYTE [DISTWIDTH] IN BLOOD BY AUTOMATED COUNT: 14.2 % (ref 10–15)
GLUCOSE SERPL-MCNC: 131 MG/DL (ref 70–99)
GLUCOSE UR STRIP-MCNC: 30 MG/DL
HCO3 BLDV-SCNC: 30 MMOL/L (ref 21–28)
HCO3 SERPL-SCNC: 27 MMOL/L (ref 22–29)
HCT VFR BLD AUTO: 37.9 % (ref 35–47)
HGB BLD-MCNC: 12.8 G/DL (ref 11.7–15.7)
HGB UR QL STRIP: NEGATIVE
HYALINE CASTS: 9 /LPF
KETONES UR STRIP-MCNC: NEGATIVE MG/DL
LACTATE BLD-SCNC: 1.2 MMOL/L (ref 0.7–2)
LEUKOCYTE ESTERASE UR QL STRIP: ABNORMAL
LIPASE SERPL-CCNC: 10 U/L (ref 13–60)
MAGNESIUM SERPL-MCNC: 1.9 MG/DL (ref 1.7–2.3)
MCH RBC QN AUTO: 28.8 PG (ref 26.5–33)
MCHC RBC AUTO-ENTMCNC: 33.8 G/DL (ref 31.5–36.5)
MCV RBC AUTO: 85 FL (ref 78–100)
MUCOUS THREADS #/AREA URNS LPF: PRESENT /LPF
NITRATE UR QL: NEGATIVE
PCO2 BLDV: 43 MM HG (ref 40–50)
PH BLDV: 7.44 [PH] (ref 7.32–7.43)
PH UR STRIP: 5 [PH] (ref 5–7)
PLAT MORPH BLD: NORMAL
PLATELET # BLD AUTO: 311 10E3/UL (ref 150–450)
PO2 BLDV: 23 MM HG (ref 25–47)
POTASSIUM SERPL-SCNC: 3.6 MMOL/L (ref 3.4–5.3)
PROT SERPL-MCNC: 7.9 G/DL (ref 6.4–8.3)
RBC # BLD AUTO: 4.45 10E6/UL (ref 3.8–5.2)
RBC MORPH BLD: NORMAL
RBC URINE: 5 /HPF
SAO2 % BLDV: 41 % (ref 70–75)
SODIUM SERPL-SCNC: 131 MMOL/L (ref 135–145)
SP GR UR STRIP: 1.03 (ref 1–1.03)
SQUAMOUS EPITHELIAL: 21 /HPF
TRANSITIONAL EPI: <1 /HPF
UROBILINOGEN UR STRIP-MCNC: 2 MG/DL
WBC # BLD AUTO: 26.2 10E3/UL (ref 4–11)
WBC URINE: 37 /HPF

## 2025-06-16 PROCEDURE — 87040 BLOOD CULTURE FOR BACTERIA: CPT | Performed by: EMERGENCY MEDICINE

## 2025-06-16 PROCEDURE — 36415 COLL VENOUS BLD VENIPUNCTURE: CPT | Performed by: EMERGENCY MEDICINE

## 2025-06-16 PROCEDURE — 96374 THER/PROPH/DIAG INJ IV PUSH: CPT | Mod: 59

## 2025-06-16 PROCEDURE — 250N000011 HC RX IP 250 OP 636: Mod: JZ | Performed by: EMERGENCY MEDICINE

## 2025-06-16 PROCEDURE — 76705 ECHO EXAM OF ABDOMEN: CPT

## 2025-06-16 PROCEDURE — 85007 BL SMEAR W/DIFF WBC COUNT: CPT | Performed by: EMERGENCY MEDICINE

## 2025-06-16 PROCEDURE — 250N000011 HC RX IP 250 OP 636: Performed by: EMERGENCY MEDICINE

## 2025-06-16 PROCEDURE — 36415 COLL VENOUS BLD VENIPUNCTURE: CPT | Performed by: STUDENT IN AN ORGANIZED HEALTH CARE EDUCATION/TRAINING PROGRAM

## 2025-06-16 PROCEDURE — 96375 TX/PRO/DX INJ NEW DRUG ADDON: CPT

## 2025-06-16 PROCEDURE — 99285 EMERGENCY DEPT VISIT HI MDM: CPT | Mod: 25

## 2025-06-16 PROCEDURE — 85014 HEMATOCRIT: CPT | Performed by: EMERGENCY MEDICINE

## 2025-06-16 PROCEDURE — 120N000001 HC R&B MED SURG/OB

## 2025-06-16 PROCEDURE — 258N000003 HC RX IP 258 OP 636: Performed by: STUDENT IN AN ORGANIZED HEALTH CARE EDUCATION/TRAINING PROGRAM

## 2025-06-16 PROCEDURE — 87086 URINE CULTURE/COLONY COUNT: CPT | Performed by: EMERGENCY MEDICINE

## 2025-06-16 PROCEDURE — 83735 ASSAY OF MAGNESIUM: CPT | Performed by: STUDENT IN AN ORGANIZED HEALTH CARE EDUCATION/TRAINING PROGRAM

## 2025-06-16 PROCEDURE — 81001 URINALYSIS AUTO W/SCOPE: CPT | Performed by: EMERGENCY MEDICINE

## 2025-06-16 PROCEDURE — 74177 CT ABD & PELVIS W/CONTRAST: CPT

## 2025-06-16 PROCEDURE — 99223 1ST HOSP IP/OBS HIGH 75: CPT | Performed by: STUDENT IN AN ORGANIZED HEALTH CARE EDUCATION/TRAINING PROGRAM

## 2025-06-16 PROCEDURE — 83605 ASSAY OF LACTIC ACID: CPT

## 2025-06-16 PROCEDURE — 250N000013 HC RX MED GY IP 250 OP 250 PS 637: Performed by: STUDENT IN AN ORGANIZED HEALTH CARE EDUCATION/TRAINING PROGRAM

## 2025-06-16 PROCEDURE — 82310 ASSAY OF CALCIUM: CPT | Performed by: EMERGENCY MEDICINE

## 2025-06-16 PROCEDURE — 83690 ASSAY OF LIPASE: CPT | Performed by: EMERGENCY MEDICINE

## 2025-06-16 PROCEDURE — 250N000009 HC RX 250: Performed by: EMERGENCY MEDICINE

## 2025-06-16 PROCEDURE — 258N000003 HC RX IP 258 OP 636: Performed by: EMERGENCY MEDICINE

## 2025-06-16 PROCEDURE — 96361 HYDRATE IV INFUSION ADD-ON: CPT

## 2025-06-16 RX ORDER — SODIUM CHLORIDE, SODIUM LACTATE, POTASSIUM CHLORIDE, CALCIUM CHLORIDE 600; 310; 30; 20 MG/100ML; MG/100ML; MG/100ML; MG/100ML
INJECTION, SOLUTION INTRAVENOUS CONTINUOUS
Status: DISCONTINUED | OUTPATIENT
Start: 2025-06-16 | End: 2025-06-18

## 2025-06-16 RX ORDER — PIPERACILLIN SODIUM, TAZOBACTAM SODIUM 3; .375 G/15ML; G/15ML
3.38 INJECTION, POWDER, LYOPHILIZED, FOR SOLUTION INTRAVENOUS EVERY 6 HOURS
Status: DISCONTINUED | OUTPATIENT
Start: 2025-06-17 | End: 2025-06-16

## 2025-06-16 RX ORDER — HYDROMORPHONE HCL IN WATER/PF 6 MG/30 ML
0.4 PATIENT CONTROLLED ANALGESIA SYRINGE INTRAVENOUS
Status: DISCONTINUED | OUTPATIENT
Start: 2025-06-16 | End: 2025-06-18 | Stop reason: HOSPADM

## 2025-06-16 RX ORDER — AMPICILLIN AND SULBACTAM 2; 1 G/1; G/1
3 INJECTION, POWDER, FOR SOLUTION INTRAMUSCULAR; INTRAVENOUS ONCE
Status: DISCONTINUED | OUTPATIENT
Start: 2025-06-16 | End: 2025-06-16

## 2025-06-16 RX ORDER — ONDANSETRON 4 MG/1
4 TABLET, ORALLY DISINTEGRATING ORAL EVERY 6 HOURS PRN
Status: DISCONTINUED | OUTPATIENT
Start: 2025-06-16 | End: 2025-06-18 | Stop reason: HOSPADM

## 2025-06-16 RX ORDER — LIDOCAINE 40 MG/G
CREAM TOPICAL
Status: DISCONTINUED | OUTPATIENT
Start: 2025-06-16 | End: 2025-06-18 | Stop reason: HOSPADM

## 2025-06-16 RX ORDER — PIPERACILLIN SODIUM, TAZOBACTAM SODIUM 3; .375 G/15ML; G/15ML
3.38 INJECTION, POWDER, LYOPHILIZED, FOR SOLUTION INTRAVENOUS EVERY 6 HOURS
Status: DISCONTINUED | OUTPATIENT
Start: 2025-06-17 | End: 2025-06-18 | Stop reason: HOSPADM

## 2025-06-16 RX ORDER — ONDANSETRON 2 MG/ML
4 INJECTION INTRAMUSCULAR; INTRAVENOUS EVERY 6 HOURS PRN
Status: DISCONTINUED | OUTPATIENT
Start: 2025-06-16 | End: 2025-06-18 | Stop reason: HOSPADM

## 2025-06-16 RX ORDER — PROCHLORPERAZINE MALEATE 5 MG/1
5 TABLET ORAL EVERY 6 HOURS PRN
Status: DISCONTINUED | OUTPATIENT
Start: 2025-06-16 | End: 2025-06-18 | Stop reason: HOSPADM

## 2025-06-16 RX ORDER — OXYCODONE HYDROCHLORIDE 5 MG/1
5 TABLET ORAL EVERY 4 HOURS PRN
Status: DISCONTINUED | OUTPATIENT
Start: 2025-06-16 | End: 2025-06-18 | Stop reason: HOSPADM

## 2025-06-16 RX ORDER — CALCIUM CARBONATE 500 MG/1
1000 TABLET, CHEWABLE ORAL 4 TIMES DAILY PRN
Status: DISCONTINUED | OUTPATIENT
Start: 2025-06-16 | End: 2025-06-18 | Stop reason: HOSPADM

## 2025-06-16 RX ORDER — ONDANSETRON 2 MG/ML
4 INJECTION INTRAMUSCULAR; INTRAVENOUS EVERY 30 MIN PRN
Status: DISCONTINUED | OUTPATIENT
Start: 2025-06-16 | End: 2025-06-16

## 2025-06-16 RX ORDER — HYDROMORPHONE HCL IN WATER/PF 6 MG/30 ML
0.2 PATIENT CONTROLLED ANALGESIA SYRINGE INTRAVENOUS
Status: DISCONTINUED | OUTPATIENT
Start: 2025-06-16 | End: 2025-06-18 | Stop reason: HOSPADM

## 2025-06-16 RX ORDER — KETOROLAC TROMETHAMINE 15 MG/ML
30 INJECTION, SOLUTION INTRAMUSCULAR; INTRAVENOUS ONCE
Status: COMPLETED | OUTPATIENT
Start: 2025-06-16 | End: 2025-06-16

## 2025-06-16 RX ORDER — AMOXICILLIN 250 MG
1 CAPSULE ORAL 2 TIMES DAILY PRN
Status: DISCONTINUED | OUTPATIENT
Start: 2025-06-16 | End: 2025-06-18

## 2025-06-16 RX ORDER — ACETAMINOPHEN 650 MG/1
650 SUPPOSITORY RECTAL EVERY 4 HOURS PRN
Status: DISCONTINUED | OUTPATIENT
Start: 2025-06-16 | End: 2025-06-18 | Stop reason: HOSPADM

## 2025-06-16 RX ORDER — POLYETHYLENE GLYCOL 3350 17 G/17G
17 POWDER, FOR SOLUTION ORAL 2 TIMES DAILY PRN
Status: DISCONTINUED | OUTPATIENT
Start: 2025-06-16 | End: 2025-06-18

## 2025-06-16 RX ORDER — PIPERACILLIN SODIUM, TAZOBACTAM SODIUM 4; .5 G/20ML; G/20ML
4.5 INJECTION, POWDER, LYOPHILIZED, FOR SOLUTION INTRAVENOUS ONCE
Status: COMPLETED | OUTPATIENT
Start: 2025-06-16 | End: 2025-06-16

## 2025-06-16 RX ORDER — IOPAMIDOL 755 MG/ML
102 INJECTION, SOLUTION INTRAVASCULAR ONCE
Status: COMPLETED | OUTPATIENT
Start: 2025-06-16 | End: 2025-06-16

## 2025-06-16 RX ORDER — ACETAMINOPHEN 325 MG/1
650 TABLET ORAL EVERY 4 HOURS PRN
Status: DISCONTINUED | OUTPATIENT
Start: 2025-06-16 | End: 2025-06-18 | Stop reason: HOSPADM

## 2025-06-16 RX ORDER — AMOXICILLIN 250 MG
2 CAPSULE ORAL 2 TIMES DAILY PRN
Status: DISCONTINUED | OUTPATIENT
Start: 2025-06-16 | End: 2025-06-18

## 2025-06-16 RX ADMIN — SODIUM CHLORIDE 69 ML: 9 INJECTION, SOLUTION INTRAVENOUS at 19:17

## 2025-06-16 RX ADMIN — ONDANSETRON 4 MG: 2 INJECTION, SOLUTION INTRAMUSCULAR; INTRAVENOUS at 13:17

## 2025-06-16 RX ADMIN — SODIUM CHLORIDE, SODIUM LACTATE, POTASSIUM CHLORIDE, AND CALCIUM CHLORIDE: .6; .31; .03; .02 INJECTION, SOLUTION INTRAVENOUS at 22:06

## 2025-06-16 RX ADMIN — ACETAMINOPHEN 650 MG: 325 TABLET, FILM COATED ORAL at 20:21

## 2025-06-16 RX ADMIN — KETOROLAC TROMETHAMINE 30 MG: 15 INJECTION, SOLUTION INTRAMUSCULAR; INTRAVENOUS at 13:17

## 2025-06-16 RX ADMIN — IOPAMIDOL 102 ML: 755 INJECTION, SOLUTION INTRAVENOUS at 19:17

## 2025-06-16 RX ADMIN — SODIUM CHLORIDE 1000 ML: 0.9 INJECTION, SOLUTION INTRAVENOUS at 13:17

## 2025-06-16 RX ADMIN — PIPERACILLIN AND TAZOBACTAM 4.5 G: 4; .5 INJECTION, POWDER, FOR SOLUTION INTRAVENOUS at 19:40

## 2025-06-16 ASSESSMENT — ACTIVITIES OF DAILY LIVING (ADL)
ADLS_ACUITY_SCORE: 22
ADLS_ACUITY_SCORE: 41
ADLS_ACUITY_SCORE: 41
ADLS_ACUITY_SCORE: 18
ADLS_ACUITY_SCORE: 41

## 2025-06-16 ASSESSMENT — COLUMBIA-SUICIDE SEVERITY RATING SCALE - C-SSRS
2. HAVE YOU ACTUALLY HAD ANY THOUGHTS OF KILLING YOURSELF IN THE PAST MONTH?: NO
1. IN THE PAST MONTH, HAVE YOU WISHED YOU WERE DEAD OR WISHED YOU COULD GO TO SLEEP AND NOT WAKE UP?: NO
6. HAVE YOU EVER DONE ANYTHING, STARTED TO DO ANYTHING, OR PREPARED TO DO ANYTHING TO END YOUR LIFE?: NO

## 2025-06-16 NOTE — H&P
"[facility]    History and Physical - Hospitalist Service       Date of Admission:  6/16/2025    Assessment & Plan      Farhaan Michael is a 69 year old female admitted on 6/16/2025. She presents with nausea and vomiting for 5 days.     Cholecystitis  Ultrasound with distended gallbladder but no obvious cholecystitis  CT abdomen with e/o cholecystitis  - inpatient  - consult general surgery  - NPO at [facility]  - abx given leukocytosis    Possible UTI  No clear urinary symptoms, and ua with significant squamous cells, but UA does otherwise appear infected  - abx as above  - monitor urine culture    SHAUN  Likely prerenal from poor oral intake  - IVF  - monitor    Metabolic alkalosis  Likely related to vomiting  - monitor    Hx of endometrial carcinoma s/p hysterectomy in 2007  - monitor    HTN  - hold PTA meds given above  - monitor          Diet:  Regular  DVT Prophylaxis: Pneumatic Compression Devices  Rick Catheter: Not present  Lines: None     Cardiac Monitoring: None  Code Status:  FULL CODE    Clinically Significant Risk Factors Present on Admission         # Hyponatremia: Lowest Na = 131 mmol/L in last 2 days, will monitor as appropriate  # Hypochloremia: Lowest Cl = 89 mmol/L in last 2 days, will monitor as appropriate          # Hypertension: Noted on problem list           # Obesity: Estimated body mass index is 34.84 kg/m  as calculated from the following:    Height as of this encounter: 1.626 m (5' 4\").    Weight as of this encounter: 92.1 kg (203 lb).              Disposition Plan     Medically Ready for Discharge: Anticipated in 2-4 Days           Rob Hamilton MD  Hospitalist Service  [facility]  Securely message with [employer] (more info)  Text page via [employer] Paging/Directory     ______________________________________________________________________    Chief Complaint   nausea    History is obtained from the patient, electronic health record, and emergency " department physician    History of Present Illness   Farhana Michael is a 69 year old female who presents with 5 days of persistent nausea and vomiting. No vomiting for last 24 to 36 hours but persistent nausea. She has limited appetite. She has diffuse abdominal pain. She reports no ill contacts. No f/c/r. Stools have been regular w/o change other than volume has decreased given poor oral intake.       Past Medical History    Past Medical History:   Diagnosis Date    Endometrial cancer (H) 2007    IB grade 3 endometrial ca. had LAVH/BSO and LND. then did one month of brachytherapy. tested for Ag syndrome and negative    GERD (gastroesophageal reflux disease)     Hernia, hiatal, congenital 03/21/2014    Hypertension, essential     Dr. Sophie Cheema at [facility]    Lymphedema        Past Surgical History   Past Surgical History:   Procedure Laterality Date    AS REVISE TOTAL HIP REPLACEMENT  01/2025    Basal Cell carcinoma of skin excision  01/01/2009    COLONOSCOPY  01/10/2013    Procedure: COLONOSCOPY;  COLONOSCOPY;  Surgeon: Heather Brown MD;  Location:  GI    COLONOSCOPY N/A 02/08/2018    Procedure: COLONOSCOPY;  COLONOSCOPY ;  Surgeon: Jean Carlos Bhakta MD;  Location:  GI    CONIZATION  01/01/1979    ROSENDO I or II    HCL SQUAMOUS CELL CARCINOMA AG  01/01/2010    eyelid    HYSTERECTOMY  01/05/2007       Prior to Admission Medications   Prior to Admission Medications   Prescriptions Last Dose Informant Patient Reported? Taking?   amoxicillin (AMOXIL) 500 MG capsule   Yes No   Sig: Take 4 capsules 1 hour prior to any dental procedure, including routine cleaning.   clindamycin (CLEOCIN T) 1 % external lotion   Yes No   Sig: APPLY TOPICALLY TO FACE DAILY   hydrochlorothiazide 10 mg/mL SUSP   Yes No   Sig: Take 37.5 mg by mouth daily.   lisinopril (ZESTRIL) 2.5 MG tablet   Yes No   Sig: Take 1 tablet by mouth daily at 2 pm   metroNIDAZOLE (METROCREAM) 0.75 % cream   Yes No   Sig: Apply topically 2 times  daily   omeprazole (PRILOSEC) 20 MG DR capsule   Yes No   Sig: TAKE 1 CAPSULE BY MOUTH DAILY AS NEEDED FOR STOMACH ACID OR REFLUX   potassium chloride SA (K-DUR/KLOR-CON M) 20 MEQ CR tablet   Yes No   Sig: Take 20 mEq by mouth   prednisoLONE acetate (PRED FORTE) 1 % ophthalmic suspension   Yes No   Patient not taking: Reported on 6/13/2025   triamterene-hydrochlorothiazide (MAXZIDE-25) 37.5-25 MG per tablet   Yes No   Sig: Take 1 tablet by mouth daily.   Patient not taking: Reported on 6/13/2025      Facility-Administered Medications: None           Physical Exam   Vital Signs: Temp: (!) 96  F (35.6  C) Temp src: Temporal BP: 110/71 Pulse: 91   Resp: 18 SpO2: 99 % O2 Device: None (Room air)    Weight: 203 lbs 0 oz    Constitutional: Awake, alert, cooperative, no apparent distress  Respiratory: Clear to auscultation bilaterally, no crackles or wheezing  Cardiovascular: Regular rate and rhythm, normal S1 and S2, and no murmur noted  GI: Normal bowel sounds, soft, non-distended, tender diffusely but moreso LLQ and RUQ  Skin/Integumen: No rashes, no cyanosis, no edema  Other:       Medical Decision Making       76 MINUTES SPENT BY ME on the date of service doing chart review, history, exam, documentation & further activities per the note.      Data   ------------------------- PAST 24 HR DATA REVIEWED -----------------------------------------------    I have personally reviewed the following data over the past 24 hrs:    26.2 (H)  \   12.8   / 311     131 (L) 89 (L) 14.4 /  131 (H)   3.6 27 1.56 (H) \     ALT: 20 AST: 18 AP: 136 TBILI: 0.6   ALB: 4.0 TOT PROTEIN: 7.9 LIPASE: 10 (L)     Procal: N/A CRP: N/A Lactic Acid: 1.2         Imaging results reviewed over the past 24 hrs:   Recent Results (from the past 24 hours)   US Abdomen Limited    Narrative    EXAM: US ABDOMEN LIMITED  LOCATION: [facility]  DATE: 6/16/2025    INDICATION: RUQ pain with N V  COMPARISON: Limited abdominal sonogram  05/16/2008  TECHNIQUE: Limited abdominal ultrasound.    FINDINGS:    GALLBLADDER: The gallbladder is distended, measuring approximately 9.8 x 5.5 x 5.3 cm. No gallbladder wall thickening pericholecystic fluid or focal tenderness to transducer palpation of the gallbladder.    BILE DUCTS: No biliary dilatation. The common duct measures 5 mm.    LIVER: Increased echogenicity from diffuse fatty infiltration with suspected focal fatty sparing about the gallbladder. Limited evaluation of the liver with incomplete visualization. No focal mass; reported previously seen subcentimeter lesion on prior   CT examination was not seen on the current sonogram (2009, images unavailable for comparison at time of dictation). The portal vein is patent with flow in the normal direction.    RIGHT KIDNEY: No hydronephrosis.    PANCREAS: The head appears normal; the body and tail are not well seen secondary to overlying bowel gas.    No ascites.      Impression    IMPRESSION:  1.  Distended gallbladder without cholelithiasis or findings of acute cholecystitis. No biliary ductal dilation.  2.  Hepatic steatosis. No focal hepatic lesion identified; however, the liver is incompletely evaluated secondary to limited visualization from hepatic steatosis and body habitus.

## 2025-06-16 NOTE — ED TRIAGE NOTES
Nausea and vomiting for 5 days. Having weakness.      Triage Assessment (Adult)       Row Name 06/16/25 1142          Triage Assessment    Airway WDL WDL        Respiratory WDL    Respiratory WDL WDL        Cardiac WDL    Cardiac WDL WDL        Peripheral/Neurovascular WDL    Peripheral Neurovascular WDL WDL        Cognitive/Neuro/Behavioral WDL    Cognitive/Neuro/Behavioral WDL WDL

## 2025-06-16 NOTE — ED PROVIDER NOTES
"  Emergency Department Note      History of Present Illness     Chief Complaint   Nausea & Vomiting      HPI   Farhana Michael is a 69 year old female with a history of hypertension, hyperlipidemia, hypercholesterolemia, malignant neoplasm of endometrium, anxiety, GERD, and IBS who presents to the ED with daughter for evaluation of nausea and vomiting. The patient reports an onset of vomiting on 6/11/25 with an additional 30-40 episodes of dry heaving daily, other than yesterday which was due to not eating anything and sleeping all day according to the patient. She states that use of Gatorade, water, and Zofran has not helped alleviate symptoms. She notes that she has also been experiencing weakness, resulting in use of a cane to ambulate, further mentioning the cane was originally given to her after a previous hip surgery and current use is abnormal. She endorses some retention of foods and fluids, weakness, occasional alcohol intake described as \"a glass of wine,\" and abdominal pain but she notes this is likely due to a hiatal hernia. She denies any diarrhea, recent travel, sick contact, urinary symptoms, or any drug use.    Independent Historian   None    Review of External Notes   None    Past Medical History     Medical History and Problem List   Arthritis of left hip  Gastroesophageal reflux disease   History of endometrial cancer  Hyperlipidemia   Hypertension, essential  Irritable bowel syndrome   Lymphedema   Malignant neoplasm of endometrium   Pure hypercholesterolemia     Medications   ZESTRIL  PRILOSEC   K-DUR   MAXZIDE-25   Hydrochlorothiazide     Surgical History   As revise total hip placement   Basal cell carcinoma of skin excision   Conization   Hcl squamous cell carcinoma ag   Hysterectomy   KEENAN and BSO   EGD, combined   MOHS surgery   KIERA left     Physical Exam     Patient Vitals for the past 24 hrs:   BP Temp Temp src Pulse Resp SpO2 Height Weight   06/16/25 1141 110/71 (!) 96  F (35.6  C) Temporal " "91 18 99 % 1.626 m (5' 4\") 92.1 kg (203 lb)     Physical Exam  Nursing note and vitals reviewed.    Constitutional:  Appears comfortable.    HENT:                Nose normal.  No discharge.      Oral mucosa is moist.  Eyes:    Conjunctivae are normal without injection.  Pupils are equal.  Cardiovascular:  Normal rate, regular rhythm with normal S1 and S2.   Pulmonary:  Effort normal and breath sounds clear to auscultation bilaterally.   GI:    Soft. No distension and no mass.      No flank pain.  Epigastric and RUQ pain with guarding. Mildly positive Franco's sign.  Musculoskeletal:  Normal range of motion. No extremity deformity.     No edema and no tenderness.    Neurological:   Alert and oriented. No focal weakness.  Skin:    Skin is warm and dry. No rash noted.   Psychiatric:   Behavior is normal. Appropriate mood and affect.     Judgment and thought content normal.     Diagnostics     Lab Results   Labs Ordered and Resulted from Time of ED Arrival to Time of ED Departure   COMPREHENSIVE METABOLIC PANEL - Abnormal       Result Value    Sodium 131 (*)     Potassium 3.6      Carbon Dioxide (CO2) 27      Anion Gap 15      Urea Nitrogen 14.4      Creatinine 1.56 (*)     GFR Estimate 36 (*)     Calcium 9.7      Chloride 89 (*)     Glucose 131 (*)     Alkaline Phosphatase 136      AST 18      ALT 20      Protein Total 7.9      Albumin 4.0      Bilirubin Total 0.6     LIPASE - Abnormal    Lipase 10 (*)    CBC WITH PLATELETS AND DIFFERENTIAL - Abnormal    WBC Count 26.2 (*)     RBC Count 4.45      Hemoglobin 12.8      Hematocrit 37.9      MCV 85      MCH 28.8      MCHC 33.8      RDW 14.2      Platelet Count 311     MANUAL DIFFERENTIAL - Abnormal    % Neutrophils 83      % Lymphocytes 6      % Monocytes 11      % Eosinophils 0      % Basophils 0      Absolute Neutrophils 21.7 (*)     Absolute Lymphocytes 1.6      Absolute Monocytes 2.9 (*)     Absolute Eosinophils 0.0      Absolute Basophils 0.0     ROUTINE UA WITH " MICROSCOPIC REFLEX TO CULTURE - Abnormal    Color Urine Orange (*)     Appearance Urine Cloudy (*)     Glucose Urine 30 (*)     Bilirubin Urine Small (*)     Ketones Urine Negative      Specific Gravity Urine 1.027      Blood Urine Negative      pH Urine 5.0      Protein Albumin Urine 30 (*)     Urobilinogen Urine 2.0 (*)     Nitrite Urine Negative      Leukocyte Esterase Urine Moderate (*)     Bacteria Urine Few (*)     Mucus Urine Present (*)     RBC Urine 5 (*)     WBC Urine 37 (*)     Squamous Epithelials Urine 21 (*)     Transitional Epithelials Urine <1      Hyaline Casts Urine 9 (*)    ISTAT GASES LACTATE VENOUS POCT - Abnormal    Lactic Acid POCT 1.2      Bicarbonate Venous POCT 30 (*)     O2 Sat, Venous POCT 41 (*)     pCO2 Venous POCT 43      pH Venous POCT 7.44 (*)     pO2 Venous POCT 23 (*)     Base Excess/Deficit (+/-) POCT 5.0 (*)    RBC AND PLATELET MORPHOLOGY    RBC Morphology Confirmed RBC Indices      Platelet Assessment        Value: Automated Count Confirmed. Platelet morphology is normal.   BLOOD CULTURE   BLOOD CULTURE   URINE CULTURE       Imaging   US Abdomen Limited   Final Result   IMPRESSION:   1.  Distended gallbladder without cholelithiasis or findings of acute cholecystitis. No biliary ductal dilation.   2.  Hepatic steatosis. No focal hepatic lesion identified; however, the liver is incompletely evaluated secondary to limited visualization from hepatic steatosis and body habitus.            CT Abdomen Pelvis w Contrast    (Results Pending)     Independent Interpretation   None    ED Course      Medications Administered   Medications   ondansetron (ZOFRAN) injection 4 mg (4 mg Intravenous $Given 6/16/25 1317)   piperacillin-tazobactam (ZOSYN) 4.5 g vial to attach to  mL bag (has no administration in time range)   sodium chloride 0.9% BOLUS 1,000 mL (0 mLs Intravenous Stopped 6/16/25 1705)   ketorolac (TORADOL) injection 30 mg (30 mg Intravenous $Given 6/16/25 1317)   iopamidol  (ISOVUE-370) solution 102 mL (102 mLs Intravenous $Given 6/16/25 1917)   sodium chloride 0.9 % bag for CT scan flush (69 mLs Intravenous $Given 6/16/25 1917)       Procedures   Procedures     Discussion of Management   Admitting Hospitalist, Dr. Hamilton    ED Course   ED Course as of 06/16/25 1922 Mon Jun 16, 2025   1246 I obtained history and examined the patient as noted above     1704 I rechecked the patient and explained findings.   1744 I rechecked the patient and explained plan of care.   1815 I spoke with Dr. Hamilton of the hospitalist team who accepted the patient for admission.       Additional Documentation  None    Medical Decision Making / Diagnosis     CMS Diagnoses: IV Antibiotics given and/or elevated Lactate of 1.2 and no sepsis note found - Delete this reminder and enter the sepsis note or '.edcms' before signing chart.>>>None    The patient has an elevated white count but a normal lactic acid but because of the abdominal pain and vomiting I did get blood cultures and they were obtained before I gave her a dose of Zosyn for possible cholecystitis.  Believe that she has sepsis or severe sepsis.    MIPS   None               [facility]   Farhana Michael is a 69 year old female comes in with 5 days of vomiting and upper abdominal pain.  She had a little bit of guarding and a mildly positive Franco sign.  Labs were obtained and her LFTs came back normal, white count though was 26.2 with a left shift, sodium was mildly low at 131, she has acute kidney injury with a GFR of 36 and creatinine 1.56.  BUN was only 14.  She was given IV fluids, Toradol for pain and Zofran for nausea.  The patient was in the lobby for a long time as we had no beds in the back for her to be roomed but when I rechecked her and went over her labs with her she was feeling much better.  However she still had abdominal tenderness.  Ultrasound was obtained and showed a distended gallbladder without evidence of cholecystitis or gallstones  or obstruction.  Her lipase was also normal.  At this point with the white count being elevated, the fact that she is got a distended gallbladder and not eaten for 5 days and acute kidney injury she needs to come in the hospital.  I talked with Dr. Hamilton the patient will be admitted.  Will get a CT to rule out any other process that could be causing her pain and vomiting.  Hopefully with some hydration her renal function will improve.  I did order a dose of Zosyn because of the possibility that this is cholecystitis.    Disposition   The patient was admitted to the hospital.     Diagnosis     ICD-10-CM    1. Pain of upper abdomen  R10.10     Gastric and right upper quadrant      2. Gallbladder pain  K82.9       3. Nausea and vomiting, unspecified vomiting type  R11.2       4. SHAUN (acute kidney injury)  N17.9       5. Leukocytosis, unspecified type  D72.829            Discharge Medications   New Prescriptions    No medications on file         Scribe Disclosure:  I, Marybeth Blake, am serving as a scribe at 1:43 PM on 6/16/2025 to document services personally performed by Autumn Schmidt MD based on my observations and the provider's statements to me.        Autumn Schmidt MD  06/16/25 1924

## 2025-06-17 ENCOUNTER — ANESTHESIA EVENT (OUTPATIENT)
Dept: SURGERY | Facility: CLINIC | Age: 70
DRG: 418 | End: 2025-06-17
Payer: COMMERCIAL

## 2025-06-17 ENCOUNTER — ANESTHESIA (OUTPATIENT)
Dept: SURGERY | Facility: CLINIC | Age: 70
DRG: 418 | End: 2025-06-17
Payer: COMMERCIAL

## 2025-06-17 LAB
ANION GAP SERPL CALCULATED.3IONS-SCNC: 14 MMOL/L (ref 7–15)
BACTERIA UR CULT: NORMAL
BUN SERPL-MCNC: 19.7 MG/DL (ref 8–23)
CALCIUM SERPL-MCNC: 8.9 MG/DL (ref 8.8–10.4)
CHLORIDE SERPL-SCNC: 92 MMOL/L (ref 98–107)
CREAT SERPL-MCNC: 1.44 MG/DL (ref 0.51–0.95)
EGFRCR SERPLBLD CKD-EPI 2021: 40 ML/MIN/1.73M2
ERYTHROCYTE [DISTWIDTH] IN BLOOD BY AUTOMATED COUNT: 14.6 % (ref 10–15)
GLUCOSE SERPL-MCNC: 102 MG/DL (ref 70–99)
HCO3 SERPL-SCNC: 26 MMOL/L (ref 22–29)
HCT VFR BLD AUTO: 32.6 % (ref 35–47)
HGB BLD-MCNC: 10.7 G/DL (ref 11.7–15.7)
MAGNESIUM SERPL-MCNC: 2.1 MG/DL (ref 1.7–2.3)
MCH RBC QN AUTO: 28.8 PG (ref 26.5–33)
MCHC RBC AUTO-ENTMCNC: 32.8 G/DL (ref 31.5–36.5)
MCV RBC AUTO: 88 FL (ref 78–100)
PLATELET # BLD AUTO: 237 10E3/UL (ref 150–450)
POTASSIUM SERPL-SCNC: 3.6 MMOL/L (ref 3.4–5.3)
RBC # BLD AUTO: 3.72 10E6/UL (ref 3.8–5.2)
SODIUM SERPL-SCNC: 132 MMOL/L (ref 135–145)
WBC # BLD AUTO: 19.8 10E3/UL (ref 4–11)

## 2025-06-17 PROCEDURE — 99222 1ST HOSP IP/OBS MODERATE 55: CPT | Mod: 57 | Performed by: SURGERY

## 2025-06-17 PROCEDURE — 83735 ASSAY OF MAGNESIUM: CPT | Performed by: STUDENT IN AN ORGANIZED HEALTH CARE EDUCATION/TRAINING PROGRAM

## 2025-06-17 PROCEDURE — 258N000003 HC RX IP 258 OP 636

## 2025-06-17 PROCEDURE — 360N000076 HC SURGERY LEVEL 3, PER MIN: Performed by: SURGERY

## 2025-06-17 PROCEDURE — 999N000141 HC STATISTIC PRE-PROCEDURE NURSING ASSESSMENT: Performed by: SURGERY

## 2025-06-17 PROCEDURE — 250N000025 HC SEVOFLURANE, PER MIN: Performed by: SURGERY

## 2025-06-17 PROCEDURE — 36415 COLL VENOUS BLD VENIPUNCTURE: CPT | Performed by: STUDENT IN AN ORGANIZED HEALTH CARE EDUCATION/TRAINING PROGRAM

## 2025-06-17 PROCEDURE — 0FT44ZZ RESECTION OF GALLBLADDER, PERCUTANEOUS ENDOSCOPIC APPROACH: ICD-10-PCS | Performed by: SURGERY

## 2025-06-17 PROCEDURE — 88304 TISSUE EXAM BY PATHOLOGIST: CPT | Mod: TC | Performed by: SURGERY

## 2025-06-17 PROCEDURE — 250N000011 HC RX IP 250 OP 636

## 2025-06-17 PROCEDURE — 250N000011 HC RX IP 250 OP 636: Performed by: SURGERY

## 2025-06-17 PROCEDURE — 47562 LAPAROSCOPIC CHOLECYSTECTOMY: CPT | Mod: 22 | Performed by: SURGERY

## 2025-06-17 PROCEDURE — 250N000011 HC RX IP 250 OP 636: Performed by: STUDENT IN AN ORGANIZED HEALTH CARE EDUCATION/TRAINING PROGRAM

## 2025-06-17 PROCEDURE — 85027 COMPLETE CBC AUTOMATED: CPT | Performed by: STUDENT IN AN ORGANIZED HEALTH CARE EDUCATION/TRAINING PROGRAM

## 2025-06-17 PROCEDURE — 250N000009 HC RX 250

## 2025-06-17 PROCEDURE — 258N000003 HC RX IP 258 OP 636: Performed by: STUDENT IN AN ORGANIZED HEALTH CARE EDUCATION/TRAINING PROGRAM

## 2025-06-17 PROCEDURE — 710N000009 HC RECOVERY PHASE 1, LEVEL 1, PER MIN: Performed by: SURGERY

## 2025-06-17 PROCEDURE — 250N000013 HC RX MED GY IP 250 OP 250 PS 637: Performed by: STUDENT IN AN ORGANIZED HEALTH CARE EDUCATION/TRAINING PROGRAM

## 2025-06-17 PROCEDURE — 370N000017 HC ANESTHESIA TECHNICAL FEE, PER MIN: Performed by: SURGERY

## 2025-06-17 PROCEDURE — 80048 BASIC METABOLIC PNL TOTAL CA: CPT | Performed by: STUDENT IN AN ORGANIZED HEALTH CARE EDUCATION/TRAINING PROGRAM

## 2025-06-17 PROCEDURE — 99232 SBSQ HOSP IP/OBS MODERATE 35: CPT | Performed by: HOSPITALIST

## 2025-06-17 PROCEDURE — 272N000001 HC OR GENERAL SUPPLY STERILE: Performed by: SURGERY

## 2025-06-17 PROCEDURE — 120N000001 HC R&B MED SURG/OB

## 2025-06-17 PROCEDURE — 250N000009 HC RX 250: Performed by: SURGERY

## 2025-06-17 RX ORDER — LIDOCAINE HYDROCHLORIDE 20 MG/ML
INJECTION, SOLUTION INFILTRATION; PERINEURAL PRN
Status: DISCONTINUED | OUTPATIENT
Start: 2025-06-17 | End: 2025-06-17

## 2025-06-17 RX ORDER — PROPOFOL 10 MG/ML
INJECTION, EMULSION INTRAVENOUS PRN
Status: DISCONTINUED | OUTPATIENT
Start: 2025-06-17 | End: 2025-06-17

## 2025-06-17 RX ORDER — GLYCOPYRROLATE 0.2 MG/ML
INJECTION, SOLUTION INTRAMUSCULAR; INTRAVENOUS PRN
Status: DISCONTINUED | OUTPATIENT
Start: 2025-06-17 | End: 2025-06-17

## 2025-06-17 RX ORDER — BUPIVACAINE HYDROCHLORIDE 2.5 MG/ML
INJECTION, SOLUTION INFILTRATION; PERINEURAL PRN
Status: DISCONTINUED | OUTPATIENT
Start: 2025-06-17 | End: 2025-06-17 | Stop reason: HOSPADM

## 2025-06-17 RX ORDER — MAGNESIUM HYDROXIDE 1200 MG/15ML
LIQUID ORAL PRN
Status: DISCONTINUED | OUTPATIENT
Start: 2025-06-17 | End: 2025-06-17 | Stop reason: HOSPADM

## 2025-06-17 RX ORDER — ONDANSETRON 4 MG/1
4 TABLET, ORALLY DISINTEGRATING ORAL EVERY 30 MIN PRN
Status: DISCONTINUED | OUTPATIENT
Start: 2025-06-17 | End: 2025-06-17

## 2025-06-17 RX ORDER — ACETAMINOPHEN 325 MG/1
975 TABLET ORAL
Status: DISCONTINUED | OUTPATIENT
Start: 2025-06-17 | End: 2025-06-17

## 2025-06-17 RX ORDER — NALOXONE HYDROCHLORIDE 0.4 MG/ML
0.1 INJECTION, SOLUTION INTRAMUSCULAR; INTRAVENOUS; SUBCUTANEOUS
Status: DISCONTINUED | OUTPATIENT
Start: 2025-06-17 | End: 2025-06-17 | Stop reason: HOSPADM

## 2025-06-17 RX ORDER — SODIUM CHLORIDE, SODIUM LACTATE, POTASSIUM CHLORIDE, CALCIUM CHLORIDE 600; 310; 30; 20 MG/100ML; MG/100ML; MG/100ML; MG/100ML
INJECTION, SOLUTION INTRAVENOUS CONTINUOUS
Status: DISCONTINUED | OUTPATIENT
Start: 2025-06-17 | End: 2025-06-17 | Stop reason: HOSPADM

## 2025-06-17 RX ORDER — DEXAMETHASONE SODIUM PHOSPHATE 4 MG/ML
4 INJECTION, SOLUTION INTRA-ARTICULAR; INTRALESIONAL; INTRAMUSCULAR; INTRAVENOUS; SOFT TISSUE
Status: DISCONTINUED | OUTPATIENT
Start: 2025-06-17 | End: 2025-06-17

## 2025-06-17 RX ORDER — ONDANSETRON 4 MG/1
4 TABLET, ORALLY DISINTEGRATING ORAL EVERY 30 MIN PRN
Status: DISCONTINUED | OUTPATIENT
Start: 2025-06-17 | End: 2025-06-17 | Stop reason: HOSPADM

## 2025-06-17 RX ORDER — LIDOCAINE 40 MG/G
CREAM TOPICAL
Status: DISCONTINUED | OUTPATIENT
Start: 2025-06-17 | End: 2025-06-18 | Stop reason: HOSPADM

## 2025-06-17 RX ORDER — CEFAZOLIN SODIUM/WATER 2 G/20 ML
2 SYRINGE (ML) INTRAVENOUS
Status: DISCONTINUED | OUTPATIENT
Start: 2025-06-17 | End: 2025-06-17 | Stop reason: HOSPADM

## 2025-06-17 RX ORDER — FENTANYL CITRATE 50 UG/ML
INJECTION, SOLUTION INTRAMUSCULAR; INTRAVENOUS PRN
Status: DISCONTINUED | OUTPATIENT
Start: 2025-06-17 | End: 2025-06-17

## 2025-06-17 RX ORDER — OXYCODONE HYDROCHLORIDE 5 MG/1
2.5-5 TABLET ORAL EVERY 4 HOURS PRN
Qty: 10 TABLET | Refills: 0 | Status: SHIPPED | OUTPATIENT
Start: 2025-06-17 | End: 2025-06-18

## 2025-06-17 RX ORDER — AMOXICILLIN 250 MG
1 CAPSULE ORAL 2 TIMES DAILY
Qty: 15 TABLET | Refills: 0 | Status: SHIPPED | OUTPATIENT
Start: 2025-06-17

## 2025-06-17 RX ORDER — HYDROMORPHONE HCL IN WATER/PF 6 MG/30 ML
0.4 PATIENT CONTROLLED ANALGESIA SYRINGE INTRAVENOUS EVERY 5 MIN PRN
Status: DISCONTINUED | OUTPATIENT
Start: 2025-06-17 | End: 2025-06-17 | Stop reason: HOSPADM

## 2025-06-17 RX ORDER — NALOXONE HYDROCHLORIDE 0.4 MG/ML
0.1 INJECTION, SOLUTION INTRAMUSCULAR; INTRAVENOUS; SUBCUTANEOUS
Status: DISCONTINUED | OUTPATIENT
Start: 2025-06-17 | End: 2025-06-17

## 2025-06-17 RX ORDER — DEXAMETHASONE SODIUM PHOSPHATE 4 MG/ML
INJECTION, SOLUTION INTRA-ARTICULAR; INTRALESIONAL; INTRAMUSCULAR; INTRAVENOUS; SOFT TISSUE PRN
Status: DISCONTINUED | OUTPATIENT
Start: 2025-06-17 | End: 2025-06-17

## 2025-06-17 RX ORDER — CEFAZOLIN SODIUM/WATER 2 G/20 ML
2 SYRINGE (ML) INTRAVENOUS SEE ADMIN INSTRUCTIONS
Status: DISCONTINUED | OUTPATIENT
Start: 2025-06-17 | End: 2025-06-17 | Stop reason: HOSPADM

## 2025-06-17 RX ORDER — DEXAMETHASONE SODIUM PHOSPHATE 4 MG/ML
4 INJECTION, SOLUTION INTRA-ARTICULAR; INTRALESIONAL; INTRAMUSCULAR; INTRAVENOUS; SOFT TISSUE
Status: DISCONTINUED | OUTPATIENT
Start: 2025-06-17 | End: 2025-06-17 | Stop reason: HOSPADM

## 2025-06-17 RX ORDER — HYDROMORPHONE HCL IN WATER/PF 6 MG/30 ML
0.2 PATIENT CONTROLLED ANALGESIA SYRINGE INTRAVENOUS EVERY 5 MIN PRN
Status: DISCONTINUED | OUTPATIENT
Start: 2025-06-17 | End: 2025-06-17 | Stop reason: HOSPADM

## 2025-06-17 RX ORDER — ONDANSETRON 2 MG/ML
4 INJECTION INTRAMUSCULAR; INTRAVENOUS EVERY 30 MIN PRN
Status: DISCONTINUED | OUTPATIENT
Start: 2025-06-17 | End: 2025-06-17

## 2025-06-17 RX ORDER — ONDANSETRON 2 MG/ML
4 INJECTION INTRAMUSCULAR; INTRAVENOUS EVERY 30 MIN PRN
Status: DISCONTINUED | OUTPATIENT
Start: 2025-06-17 | End: 2025-06-17 | Stop reason: HOSPADM

## 2025-06-17 RX ORDER — CEFAZOLIN SODIUM/WATER 2 G/20 ML
SYRINGE (ML) INTRAVENOUS PRN
Status: DISCONTINUED | OUTPATIENT
Start: 2025-06-17 | End: 2025-06-17

## 2025-06-17 RX ORDER — MEPERIDINE HYDROCHLORIDE 25 MG/ML
12.5 INJECTION INTRAMUSCULAR; INTRAVENOUS; SUBCUTANEOUS EVERY 5 MIN PRN
Status: DISCONTINUED | OUTPATIENT
Start: 2025-06-17 | End: 2025-06-17 | Stop reason: HOSPADM

## 2025-06-17 RX ORDER — PROPOFOL 10 MG/ML
INJECTION, EMULSION INTRAVENOUS CONTINUOUS PRN
Status: DISCONTINUED | OUTPATIENT
Start: 2025-06-17 | End: 2025-06-17

## 2025-06-17 RX ORDER — ONDANSETRON 2 MG/ML
INJECTION INTRAMUSCULAR; INTRAVENOUS PRN
Status: DISCONTINUED | OUTPATIENT
Start: 2025-06-17 | End: 2025-06-17

## 2025-06-17 RX ADMIN — PHENYLEPHRINE HYDROCHLORIDE 100 MCG: 10 INJECTION INTRAVENOUS at 10:45

## 2025-06-17 RX ADMIN — PHENYLEPHRINE HYDROCHLORIDE 100 MCG: 10 INJECTION INTRAVENOUS at 10:58

## 2025-06-17 RX ADMIN — PHENYLEPHRINE HYDROCHLORIDE 100 MCG: 10 INJECTION INTRAVENOUS at 10:53

## 2025-06-17 RX ADMIN — ROCURONIUM BROMIDE 10 MG: 50 INJECTION, SOLUTION INTRAVENOUS at 11:00

## 2025-06-17 RX ADMIN — FENTANYL CITRATE 100 MCG: 50 INJECTION INTRAMUSCULAR; INTRAVENOUS at 10:35

## 2025-06-17 RX ADMIN — PROPOFOL 160 MG: 10 INJECTION, EMULSION INTRAVENOUS at 10:35

## 2025-06-17 RX ADMIN — HYDROMORPHONE HYDROCHLORIDE 0.5 MG: 1 INJECTION, SOLUTION INTRAMUSCULAR; INTRAVENOUS; SUBCUTANEOUS at 11:12

## 2025-06-17 RX ADMIN — PHENYLEPHRINE HYDROCHLORIDE 100 MCG: 10 INJECTION INTRAVENOUS at 11:00

## 2025-06-17 RX ADMIN — ACETAMINOPHEN 650 MG: 325 TABLET, FILM COATED ORAL at 08:25

## 2025-06-17 RX ADMIN — PHENYLEPHRINE HYDROCHLORIDE 100 MCG: 10 INJECTION INTRAVENOUS at 10:56

## 2025-06-17 RX ADMIN — PIPERACILLIN AND TAZOBACTAM 3.38 G: 3; .375 INJECTION, POWDER, FOR SOLUTION INTRAVENOUS at 14:14

## 2025-06-17 RX ADMIN — DEXMEDETOMIDINE HYDROCHLORIDE 10 MCG: 100 INJECTION, SOLUTION INTRAVENOUS at 11:08

## 2025-06-17 RX ADMIN — ONDANSETRON 4 MG: 2 INJECTION INTRAMUSCULAR; INTRAVENOUS at 10:45

## 2025-06-17 RX ADMIN — GLYCOPYRROLATE 0.1 MG: 0.2 INJECTION, SOLUTION INTRAMUSCULAR; INTRAVENOUS at 10:59

## 2025-06-17 RX ADMIN — ACETAMINOPHEN 650 MG: 325 TABLET, FILM COATED ORAL at 21:53

## 2025-06-17 RX ADMIN — Medication 200 MG: at 11:49

## 2025-06-17 RX ADMIN — ACETAMINOPHEN 650 MG: 325 TABLET, FILM COATED ORAL at 13:32

## 2025-06-17 RX ADMIN — LIDOCAINE HYDROCHLORIDE 100 MG: 20 INJECTION, SOLUTION INFILTRATION; PERINEURAL at 10:35

## 2025-06-17 RX ADMIN — ROCURONIUM BROMIDE 50 MG: 50 INJECTION, SOLUTION INTRAVENOUS at 10:35

## 2025-06-17 RX ADMIN — PROPOFOL 40 MG: 10 INJECTION, EMULSION INTRAVENOUS at 11:08

## 2025-06-17 RX ADMIN — PIPERACILLIN AND TAZOBACTAM 3.38 G: 3; .375 INJECTION, POWDER, FOR SOLUTION INTRAVENOUS at 02:04

## 2025-06-17 RX ADMIN — DEXAMETHASONE SODIUM PHOSPHATE 4 MG: 4 INJECTION, SOLUTION INTRA-ARTICULAR; INTRALESIONAL; INTRAMUSCULAR; INTRAVENOUS; SOFT TISSUE at 10:45

## 2025-06-17 RX ADMIN — PHENYLEPHRINE HYDROCHLORIDE 100 MCG: 10 INJECTION INTRAVENOUS at 10:42

## 2025-06-17 RX ADMIN — PIPERACILLIN AND TAZOBACTAM 3.38 G: 3; .375 INJECTION, POWDER, FOR SOLUTION INTRAVENOUS at 08:27

## 2025-06-17 RX ADMIN — SODIUM CHLORIDE, SODIUM LACTATE, POTASSIUM CHLORIDE, AND CALCIUM CHLORIDE: .6; .31; .03; .02 INJECTION, SOLUTION INTRAVENOUS at 13:36

## 2025-06-17 RX ADMIN — PHENYLEPHRINE HYDROCHLORIDE 100 MCG: 10 INJECTION INTRAVENOUS at 10:40

## 2025-06-17 RX ADMIN — PROPOFOL 30 MCG/KG/MIN: 10 INJECTION, EMULSION INTRAVENOUS at 10:53

## 2025-06-17 RX ADMIN — Medication 2 G: at 10:30

## 2025-06-17 RX ADMIN — PIPERACILLIN AND TAZOBACTAM 3.38 G: 3; .375 INJECTION, POWDER, FOR SOLUTION INTRAVENOUS at 20:45

## 2025-06-17 RX ADMIN — PHENYLEPHRINE HYDROCHLORIDE 100 MCG: 10 INJECTION INTRAVENOUS at 10:49

## 2025-06-17 ASSESSMENT — ACTIVITIES OF DAILY LIVING (ADL)
ADLS_ACUITY_SCORE: 22
ADLS_ACUITY_SCORE: 25
ADLS_ACUITY_SCORE: 34
ADLS_ACUITY_SCORE: 25
ADLS_ACUITY_SCORE: 22
ADLS_ACUITY_SCORE: 25
ADLS_ACUITY_SCORE: 22
ADLS_ACUITY_SCORE: 25
ADLS_ACUITY_SCORE: 26
ADLS_ACUITY_SCORE: 26
ADLS_ACUITY_SCORE: 22
ADLS_ACUITY_SCORE: 26
ADLS_ACUITY_SCORE: 26
ADLS_ACUITY_SCORE: 22
ADLS_ACUITY_SCORE: 26
ADLS_ACUITY_SCORE: 34

## 2025-06-17 ASSESSMENT — ENCOUNTER SYMPTOMS: SEIZURES: 0

## 2025-06-17 NOTE — PLAN OF CARE
Summary: Presented with nausea and vomiting x 5 days. Findings on abdominal CT typical of Acute Cholecystitis     DATE & TIME: 6/16/25, 2030 - 0730    Cognitive Concerns/ Orientation: A&O x 4   BEHAVIOR & AGGRESSION TOOL COLOR: Green  CIWA SCORE: NA   ABNL VS/O2: BP soft, other VSS on room air  MOBILITY: Independent with cane in room. Gait steady. Denied lightheadedness/dizziness  PAIN MANAGEMENT: C/o abdominal pain/discomfort. Declined intervention   DIET: Regular. NPO maintained after 12 midnight  BOWEL/BLADDER: Continent bladder. No BM this shift  ABNL LAB/BG: AM labs pending  DRAIN/DEVICES: PIV infusing at 75 ml/hr  TELEMETRY RHYTHM: NA  SKIN: Bruising to right forearm.   TESTS/PROCEDURES: NA  D/C DAY/GOALS/PLACE: D  OTHER IMPORTANT INFO: General Surgery consulted    Goal Outcome Evaluation:      Plan of Care Reviewed With: patient    Overall Patient Progress: no changeOverall Patient Progress: no change

## 2025-06-17 NOTE — ANESTHESIA PREPROCEDURE EVALUATION
Anesthesia Pre-Procedure Evaluation    Patient: Farhana Michael   MRN: [id number] : 1955          Procedure : Procedure(s):  CHOLECYSTECTOMY, LAPAROSCOPIC         Past Medical History:   Diagnosis Date    Endometrial cancer (H)     IB grade 3 endometrial ca. had LAVH/BSO and LND. then did one month of brachytherapy. tested for Ag syndrome and negative    GERD (gastroesophageal reflux disease)     Hernia, hiatal, congenital 2014    Hypertension, essential     Dr. Sophie Cheema at [facility]    Lymphedema       Past Surgical History:   Procedure Laterality Date    AS REVISE TOTAL HIP REPLACEMENT  2025    Basal Cell carcinoma of skin excision  2009    COLONOSCOPY  01/10/2013    Procedure: COLONOSCOPY;  COLONOSCOPY;  Surgeon: Heather Brown MD;  Location:  GI    COLONOSCOPY N/A 2018    Procedure: COLONOSCOPY;  COLONOSCOPY ;  Surgeon: Jean Carlos Bhakta MD;  Location:  GI    CONIZATION  1979    ROSENDO I or II    HCL SQUAMOUS CELL CARCINOMA AG  2010    eyelid    HYSTERECTOMY  2007      Allergies   Allergen Reactions    Septra [Sulfamethoxazole-Trimethoprim]     Sulfa Antibiotics Hives      Social History     Tobacco Use    Smoking status: Never    Smokeless tobacco: Never   Substance Use Topics    Alcohol use: No     Alcohol/week: 0.0 standard drinks of alcohol      Wt Readings from Last 1 Encounters:   25 94.5 kg (208 lb 4.8 oz)        Anesthesia Evaluation   Pt has had prior anesthetic. Type of anesthetic: had a bruised uvula after prior GETA.        ROS/MED HX  ENT/Pulmonary:    (-) asthma   Neurologic:    (-) no seizures   Cardiovascular:     (+)  hypertension- -   -  - -                                      METS/Exercise Tolerance:     Hematologic:       Musculoskeletal:       GI/Hepatic:     (+) GERD,                   Renal/Genitourinary:     (+) renal disease,             Endo:     (+)               Obesity,       Psychiatric/Substance Use:      "  Infectious Disease:       Malignancy:       Other:              Physical Exam  Airway  Mallampati: II  TM distance: >3 FB    Cardiovascular   Rhythm: regular     Dental   (+) Modest Abnormalities - crowns, retainers, 1 or 2 missing teeth      Pulmonary Breath sounds clear to auscultation        Neurological   She appears awake, alert and oriented x3.    Other Findings       OUTSIDE LABS:  CBC:   Lab Results   Component Value Date    WBC 19.8 (H) 06/17/2025    WBC 26.2 (H) 06/16/2025    HGB 10.7 (L) 06/17/2025    HGB 12.8 06/16/2025    HCT 32.6 (L) 06/17/2025    HCT 37.9 06/16/2025     06/17/2025     06/16/2025     BMP:   Lab Results   Component Value Date     (L) 06/17/2025     (L) 06/16/2025    POTASSIUM 3.6 06/17/2025    POTASSIUM 3.6 06/16/2025    CHLORIDE 92 (L) 06/17/2025    CHLORIDE 89 (L) 06/16/2025    CO2 26 06/17/2025    CO2 27 06/16/2025    BUN 19.7 06/17/2025    BUN 14.4 06/16/2025    CR 1.44 (H) 06/17/2025    CR 1.56 (H) 06/16/2025     (H) 06/17/2025     (H) 06/16/2025     COAGS:   Lab Results   Component Value Date    INR 0.97 12/26/2006     POC: No results found for: \"BGM\", \"HCG\", \"HCGS\"  HEPATIC:   Lab Results   Component Value Date    ALBUMIN 4.0 06/16/2025    PROTTOTAL 7.9 06/16/2025    ALT 20 06/16/2025    AST 18 06/16/2025    ALKPHOS 136 06/16/2025    BILITOTAL 0.6 06/16/2025     OTHER:   Lab Results   Component Value Date    LACT 1.2 06/16/2025    ANGELITA 8.9 06/17/2025    MAG 2.1 06/17/2025    LIPASE 10 (L) 06/16/2025       Anesthesia Plan    ASA Status:  2      NPO Status: NPO Appropriate   Anesthesia Type: General.  Airway: oral.  Induction: intravenous.  Maintenance: Inhalation.   Techniques and Equipment:       - Monitoring Plan: standard ASA monitoring     Consents    Anesthesia Plan(s) and associated risks, benefits, and realistic alternatives discussed. Questions answered and patient/representative(s) expressed understanding.     - Discussed:     - " "Discussed with:  Patient               Postoperative Care         Comments:    Other Comments: Dilaudid in PACU               Clari Tirado MD    I have reviewed the pertinent notes and labs in the chart from the past 30 days and (re)examined the patient.  Any updates or changes from those notes are reflected in this note.    Clinically Significant Risk Factors Present on Admission         # Hyponatremia: Lowest Na = 131 mmol/L in last 2 days, will monitor as appropriate  # Hypochloremia: Lowest Cl = 89 mmol/L in last 2 days, will monitor as appropriate          # Hypertension: Noted on problem list      # Anemia: based on hgb <11       # Obesity: Estimated body mass index is 35.75 kg/m  as calculated from the following:    Height as of this encounter: 1.626 m (5' 4\").    Weight as of this encounter: 94.5 kg (208 lb 4.8 oz).                    "

## 2025-06-17 NOTE — ANESTHESIA CARE TRANSFER NOTE
Patient: Farhana Michael    Procedure: Procedure(s):  CHOLECYSTECTOMY, LAPAROSCOPIC       Diagnosis: Acute cholecystitis [K81.0]  Diagnosis Additional Information: No value filed.    Anesthesia Type:   General     Note:    Oropharynx: oropharynx clear of all foreign objects and spontaneously breathing  Level of Consciousness: drowsy  Oxygen Supplementation: face mask  Level of Supplemental Oxygen (L/min / FiO2): 6  Independent Airway: airway patency satisfactory and stable  Dentition: dentition unchanged  Vital Signs Stable: post-procedure vital signs reviewed and stable  Report to RN Given: handoff report given  Patient transferred to: PACU  Comments: Patient comfortable  Handoff Report: Identifed the Patient, Identified the Reponsible Provider, Reviewed the pertinent medical history, Discussed the surgical course, Reviewed Intra-OP anesthesia mangement and issues during anesthesia, Set expectations for post-procedure period and Allowed opportunity for questions and acknowledgement of understanding      Vitals:  Vitals Value Taken Time   /74 06/17/25 12:10   Temp 37  C (98.6  F) 06/17/25 12:07   Pulse 91 06/17/25 12:12   Resp 18 06/17/25 12:12   SpO2 99 % 06/17/25 12:12   Vitals shown include unfiled device data.    Electronically Signed By: MAURILIO Hogan CRNA  June 17, 2025  12:13 PM

## 2025-06-17 NOTE — BRIEF OP NOTE
[facility]    Brief Operative Note    Pre-operative diagnosis: Acute cholecystitis [K81.0]  Post-operative diagnosis Same as pre-operative diagnosis    Procedure: CHOLECYSTECTOMY, LAPAROSCOPIC, N/A - Abdomen    Surgeon: Surgeons and Role:     * Antony Zuleta MD - Primary  Anesthesia: General   Estimated Blood Loss: 25 mL from 6/17/2025 10:29 AM to 6/17/2025 12:03 PM      Drains: None  Specimens:   ID Type Source Tests Collected by Time Destination   1 : GALLBLADDER AND CONTENTS Tissue Gallbladder SURGICAL PATHOLOGY EXAM Antony Zuleta MD 6/17/2025 11:40 AM      Findings:   Distended, thin walled gallbladder. Pus within the gallbladder, some controlled intra-op spillage. .  Complications: None.  Implants: * No implants in log *      Plan:   - remain in hospital overnight, possible discharge tomorrow   - continue IV abx while inpatient   - may advance diet as tolerated  - recheck labs in am       Manuel Ibrahim MD PGY-4  General Surgery   [phone]

## 2025-06-17 NOTE — CONSULTS
General Surgery Consultation  We are asked by Dr. Hamilton to see Farhana Michael in consultation regarding cholecystitis.    Farhana Michael  YOB: 1955    Age: 69 year old  MRN#: [id number]    Date of Admission: 6/16/2025  Surgeon:   ELISABETH TELLES MD                  Chief Complaint:   Abdominal pain, epigastric  Abdominal pain, right upper quadrant         History of Present Illness:   This patient is a 69 year old female who presented to the [facility] ED for evaluation of abdominal pain x1 week.     Patient first developed abdominal pain about 5-7 days ago. Has associated nausea/vomiting with pain episodes. Pain waxes and wanes. Has associated fevers. No diarrhea/constipation. Has been NPO. Previous surgery includes laparoscopic hysterectomy. Had some issues with airway edema after that case apparently. Hip surgery was done under spinal.  Patient denies any known personal or family history of bleeding disorder, clotting disorder, anesthesia reaction.         Past Medical History:    has a past medical history of Endometrial cancer (H) (2007), GERD (gastroesophageal reflux disease), Hernia, hiatal, congenital (03/21/2014), Hypertension, essential, and Lymphedema.          Past Surgical History:     Past Surgical History:   Procedure Laterality Date    AS REVISE TOTAL HIP REPLACEMENT  01/2025    Basal Cell carcinoma of skin excision  01/01/2009    COLONOSCOPY  01/10/2013    Procedure: COLONOSCOPY;  COLONOSCOPY;  Surgeon: Heather Brown MD;  Location:  GI    COLONOSCOPY N/A 02/08/2018    Procedure: COLONOSCOPY;  COLONOSCOPY ;  Surgeon: Jean Carlos Bhakta MD;  Location:  GI    CONIZATION  01/01/1979    ROSENDO I or II    HCL SQUAMOUS CELL CARCINOMA AG  01/01/2010    eyelid    HYSTERECTOMY  01/05/2007            Medications:     Prior to Admission medications    Medication Sig Start Date End Date Taking? Authorizing Provider   amoxicillin (AMOXIL) 500 MG capsule  "Take 4 capsules 1 hour prior to any dental procedure, including routine cleaning. 2/25/25  Yes Reported, Patient   clindamycin (CLEOCIN T) 1 % external lotion APPLY TOPICALLY TO FACE DAILY 3/8/24  Yes Reported, Patient   lisinopril (ZESTRIL) 2.5 MG tablet Take 1 tablet by mouth daily. 12/8/23  Yes Reported, Patient   metroNIDAZOLE (METROCREAM) 0.75 % cream Apply topically 2 times daily   Yes Reported, Patient   omeprazole (PRILOSEC) 20 MG DR capsule TAKE 1 CAPSULE BY MOUTH DAILY AS NEEDED FOR STOMACH ACID OR REFLUX 6/17/23  Yes Reported, Patient   potassium chloride SA (K-DUR/KLOR-CON M) 20 MEQ CR tablet Take 20 mEq by mouth daily. 3/9/16  Yes Reported, Patient   triamterene-hydrochlorothiazide (MAXZIDE-25) 37.5-25 MG per tablet Take 1 tablet by mouth daily.   Yes Reported, Patient            Allergies:     Allergies   Allergen Reactions    Septra [Sulfamethoxazole-Trimethoprim]     Sulfa Antibiotics Hives            Social History:     Social History     Tobacco Use    Smoking status: Never    Smokeless tobacco: Never   Substance Use Topics    Alcohol use: No     Alcohol/week: 0.0 standard drinks of alcohol             Family History:   This patient has no pertinent family history, specifically no family history of any bleeding, clotting or anesthesia problems.          Review of Systems:   Brief ROS is negative other than noted in the HPI.         Physical Exam:   Blood pressure 100/64, pulse 87, temperature 99  F (37.2  C), temperature source Oral, resp. rate 18, height 1.626 m (5' 4\"), weight 94.5 kg (208 lb 4.8 oz), SpO2 96%, not currently breastfeeding.  I/O last 3 completed shifts:  In: 442 [I.V.:442]  Out: -     General - This is a well developed, well nourished female in no apparent distress. Alert and oriented x 3  Head - normocephalic, atraumatic  Eyes - no scleral icterus, extraocular movements intact  Respiratory - breathing comfortably on room air   Cardiovascular - extremities wwp   Abdomen - Soft, " nondistended. Tender to palpation in epigastrium and RUQ. +Franco sign.   Extremities - Moves all extremities.   Neurologic - Nonfocal          Data:   Labs:  Recent Labs   Lab Test 06/17/25  0644 06/16/25  1312   WBC 19.8* 26.2*   HGB 10.7* 12.8   HCT 32.6* 37.9    311     Recent Labs   Lab Test 06/17/25  0644 06/16/25  1312   POTASSIUM 3.6 3.6   CHLORIDE 92* 89*   CO2 26 27   BUN 19.7 14.4   CR 1.44* 1.56*     Recent Labs   Lab Test 06/16/25  1312   BILITOTAL 0.6   ALT 20   AST 18   ALKPHOS 136   LIPASE 10*     No lab results found.  Recent Labs   Lab Test 06/17/25  0644 06/16/25  2124 06/16/25  1312   ANGELITA 8.9  --  9.7   MAG 2.1 1.9  --      Recent Labs   Lab Test 06/17/25  0644 06/16/25  1417 06/16/25  1312   ANIONGAP 14  --  15   PROTEIN  --  30*  --    ALBUMIN  --   --  4.0       CT scan of the abdomen:   Narrative & Impression   EXAM: CT ABDOMEN PELVIS W CONTRAST  LOCATION: [facility]  DATE: 6/16/2025     INDICATION: Abdominal pain, white count of 26,000, distended gallbladder on ultrasound.  Rule out other causes for elevated white count and nausea and vomiting  COMPARISON: Ultrasound abdomen 6/16/2025. 9/15/2010     TECHNIQUE: CT scan of the abdomen and pelvis was performed following injection of IV contrast. Multiplanar reformats were obtained. Dose reduction techniques were used.  CONTRAST: 102mL isovue 370     FINDINGS:   LOWER CHEST: Normal.     HEPATOBILIARY: The gallbladder is distended. Pericholecystic edema and inflammation. Findings would suggest acute cholecystitis. No bile duct dilatation is seen.     PANCREAS: Normal.     SPLEEN: Normal.     ADRENAL GLANDS: Normal.     KIDNEYS/BLADDER: Normal.     BOWEL: Duodenal diverticulum. Colonic diverticula without evidence for diverticulitis. Small hiatal hernia.     LYMPH NODES: Normal.     VASCULATURE: Normal.     PELVIC ORGANS: Hysterectomy.     MUSCULOSKELETAL: Left hip arthroplasty.                                                                       IMPRESSION:   1.  The gallbladder is distended with pericholecystic edema. The findings are typical of acute cholecystitis.         Ultrasound of the abdomen:   Narrative & Impression   EXAM: US ABDOMEN LIMITED  LOCATION: [facility]  DATE: 6/16/2025     INDICATION: RUQ pain with N V  COMPARISON: Limited abdominal sonogram 05/16/2008  TECHNIQUE: Limited abdominal ultrasound.     FINDINGS:     GALLBLADDER: The gallbladder is distended, measuring approximately 9.8 x 5.5 x 5.3 cm. No gallbladder wall thickening pericholecystic fluid or focal tenderness to transducer palpation of the gallbladder.     BILE DUCTS: No biliary dilatation. The common duct measures 5 mm.     LIVER: Increased echogenicity from diffuse fatty infiltration with suspected focal fatty sparing about the gallbladder. Limited evaluation of the liver with incomplete visualization. No focal mass; reported previously seen subcentimeter lesion on prior   CT examination was not seen on the current sonogram (2009, images unavailable for comparison at time of dictation). The portal vein is patent with flow in the normal direction.     RIGHT KIDNEY: No hydronephrosis.     PANCREAS: The head appears normal; the body and tail are not well seen secondary to overlying bowel gas.     No ascites.                   Assessment:   Farhana Michael is a 69 year old female with previous laparoscopic hysterectomy who is presenting with cholecystitis.          Plan:   - Laparoscopic cholecystectomy today   - NPO   - IV Zosyn        I have discussed the history, physical, and plan with Dr. Zuleta.        Manuel Ibrahim, PGY-4  [facility]

## 2025-06-17 NOTE — PROGRESS NOTES
Admission    Patient arrives to room 611 via cart from ED.  Care plan note: Initiated    Inpatient nursing criteria listed below were met:    Did you put disposition on whiteboard and in sticky note: Yes  Full skin assessment done (add LDA if skin issue present). CG RN :Yes  Isolation education started/completed NA  Patient allergies verified with patient: Yes  Fall Risk? (Care plan updated, Education given and documented) NA  Primary Care Plan initiated: Yes  Home medications documented in belongings flowsheet: NA  Patient belongings documented in belongings flowsheet: Yes  Reminder note (belongings/ medications) placed in discharge instructions:NA  Admission profile/ required documentation complete: Yes  If patient is a 72 hour hold/Commitment are belongings removed from room and locked up? NA

## 2025-06-17 NOTE — PHARMACY-ADMISSION MEDICATION HISTORY
Pharmacist Admission Medication History    Admission medication history is complete. The information provided in this note is only as accurate as the sources available at the time of the update.    Information Source(s): Patient and CareEverywhere/SureScripts via in-person    Changes made to PTA medication list:  Added: None  Deleted: Pred Forte  Changed: None    Medication History Completed By: Ching Mak RPH 6/16/2025 7:32 PM    PTA Med List   Medication Sig Last Dose/Taking    amoxicillin (AMOXIL) 500 MG capsule Take 4 capsules 1 hour prior to any dental procedure, including routine cleaning. Taking    clindamycin (CLEOCIN T) 1 % external lotion APPLY TOPICALLY TO FACE DAILY 6/16/2025    lisinopril (ZESTRIL) 2.5 MG tablet Take 1 tablet by mouth daily. 6/15/2025    metroNIDAZOLE (METROCREAM) 0.75 % cream Apply topically 2 times daily 6/16/2025    omeprazole (PRILOSEC) 20 MG DR capsule TAKE 1 CAPSULE BY MOUTH DAILY AS NEEDED FOR STOMACH ACID OR REFLUX Taking    potassium chloride SA (K-DUR/KLOR-CON M) 20 MEQ CR tablet Take 20 mEq by mouth daily. 6/15/2025    triamterene-hydrochlorothiazide (MAXZIDE-25) 37.5-25 MG per tablet Take 1 tablet by mouth daily. 6/15/2025

## 2025-06-17 NOTE — PROGRESS NOTES
RECEIVING UNIT ED HANDOFF REVIEW    ED Nurse Handoff Report was reviewed by: Karena Frias RN on June 16, 2025 at 8:11 PM

## 2025-06-17 NOTE — PROGRESS NOTES
[facility]    Medicine Progress Note - Hospitalist Service    Date of Admission:  6/16/2025    Assessment & Plan   Farhana Michael is a 69 year old female admitted on 6/16/2025. She presented with nausea and vomiting for 5 days. ED evaluation was consistent with acute cholecystitis. She was admitted to the hospitalist service for further evaluation and management.    Acute cholecystitis  Ultrasound with distended gallbladder but no obvious cholecystitis.  CT abdomen with e/o cholecystitis.  Admitted to inpatient.  General surgery consulted, appreciate their assistance.  Plan for laparoscopic cholecystectomy today.  NPO for now, defer post-op diet to general surgery.  Continue antibiotics and IV fluids.  As needed pain and nausea medications available.  Plan to continue care in the hospital tonight due to SHAUN and creatinine up from baseline, possible discharge tomorrow pending post-op course and improvement in labs.    Possible UTI  No clear urinary symptoms, and ua with significant squamous cells, but UA does otherwise appear infected.  Continue antibiotics for now as noted above.  Urine culture pending, follow up on results.    Acute kidney injury  Metabolic alkalosis  Likely prerenal from poor oral intake.  Labs improved on 6/17/25.  Continue IV fluids.  Recheck labs in AM.    Hx of endometrial carcinoma s/p hysterectomy in 2007  Noted.    Hypertension  PTA lisinopril and triamterene-hydrochlorothiazide on hold for now in setting of SHAUN.        Diet: NPO for Procedure/Surgery per Anesthesia Guidelines Except for: Meds; Clear liquids before procedure/surgery: ADULT (Age GREATER than or Equal to 18 years) - Clear liquids 2 hours before procedure/surgery    DVT Prophylaxis: Pneumatic Compression Devices  Rick Catheter: Not present  Lines: None     Cardiac Monitoring: None  Code Status: Full Code      Clinically Significant Risk Factors Present on Admission         # Hyponatremia: Lowest Na =  "131 mmol/L in last 2 days, will monitor as appropriate  # Hypochloremia: Lowest Cl = 89 mmol/L in last 2 days, will monitor as appropriate          # Hypertension: Noted on problem list      # Anemia: based on hgb <11       # Obesity: Estimated body mass index is 35.75 kg/m  as calculated from the following:    Height as of this encounter: 1.626 m (5' 4\").    Weight as of this encounter: 94.5 kg (208 lb 4.8 oz).              Social Drivers of Health            Disposition Plan     Medically Ready for Discharge: Anticipated Tomorrow         Colin Garay MD  Hospitalist Service  [facility]  Securely message with [employer] (more info)  Text page via [employer] Paging/Directory   ______________________________________________________________________    Interval History   Farhana Michael was seen this morning.  Will be going down to the OR shortly for laparoscopic cholecystectomy.  Pain and nausea continues to wax and wane.  Denies fevers, chest pain, shortness of breath.  Discussed with bedside nurse and general surgery.    Physical Exam   Vital Signs: Temp: 101.3  F (38.5  C) Temp src: Oral BP: 105/66 Pulse: 92   Resp: 16 SpO2: 96 % O2 Device: None (Room air)    Weight: 208 lbs 4.8 oz    Constitutional: awake, alert, cooperative, no apparent distress, standing up in the hospital room  Respiratory: no increased work of breathing, clear to auscultation bilaterally, no crackles or wheezing  Cardiovascular: regular rate and rhythm, normal S1 and S2, no murmur noted    Medical Decision Making       30 MINUTES SPENT BY ME on the date of service doing chart review, history, exam, documentation & further activities per the note.      Data     I have personally reviewed the following data over the past 24 hrs:    19.8 (H)  \   10.7 (L)   / 237     132 (L) 92 (L) 19.7 /  102 (H)   3.6 26 1.44 (H) \     ALT: 20 AST: 18 AP: 136 TBILI: 0.6   ALB: 4.0 TOT PROTEIN: 7.9 LIPASE: 10 (L)     Procal: N/A CRP: N/A " Lactic Acid: 1.2         Imaging results reviewed over the past 24 hrs:   Recent Results (from the past 24 hours)   US Abdomen Limited    Narrative    EXAM: US ABDOMEN LIMITED  LOCATION: [facility]  DATE: 6/16/2025    INDICATION: RUQ pain with N V  COMPARISON: Limited abdominal sonogram 05/16/2008  TECHNIQUE: Limited abdominal ultrasound.    FINDINGS:    GALLBLADDER: The gallbladder is distended, measuring approximately 9.8 x 5.5 x 5.3 cm. No gallbladder wall thickening pericholecystic fluid or focal tenderness to transducer palpation of the gallbladder.    BILE DUCTS: No biliary dilatation. The common duct measures 5 mm.    LIVER: Increased echogenicity from diffuse fatty infiltration with suspected focal fatty sparing about the gallbladder. Limited evaluation of the liver with incomplete visualization. No focal mass; reported previously seen subcentimeter lesion on prior   CT examination was not seen on the current sonogram (2009, images unavailable for comparison at time of dictation). The portal vein is patent with flow in the normal direction.    RIGHT KIDNEY: No hydronephrosis.    PANCREAS: The head appears normal; the body and tail are not well seen secondary to overlying bowel gas.    No ascites.      Impression    IMPRESSION:  1.  Distended gallbladder without cholelithiasis or findings of acute cholecystitis. No biliary ductal dilation.  2.  Hepatic steatosis. No focal hepatic lesion identified; however, the liver is incompletely evaluated secondary to limited visualization from hepatic steatosis and body habitus.       CT Abdomen Pelvis w Contrast    Narrative    EXAM: CT ABDOMEN PELVIS W CONTRAST  LOCATION: [facility]  DATE: 6/16/2025    INDICATION: Abdominal pain, white count of 26,000, distended gallbladder on ultrasound.  Rule out other causes for elevated white count and nausea and vomiting  COMPARISON: Ultrasound abdomen 6/16/2025.  9/15/2010    TECHNIQUE: CT scan of the abdomen and pelvis was performed following injection of IV contrast. Multiplanar reformats were obtained. Dose reduction techniques were used.  CONTRAST: 102mL isovue 370    FINDINGS:   LOWER CHEST: Normal.    HEPATOBILIARY: The gallbladder is distended. Pericholecystic edema and inflammation. Findings would suggest acute cholecystitis. No bile duct dilatation is seen.    PANCREAS: Normal.    SPLEEN: Normal.    ADRENAL GLANDS: Normal.    KIDNEYS/BLADDER: Normal.    BOWEL: Duodenal diverticulum. Colonic diverticula without evidence for diverticulitis. Small hiatal hernia.    LYMPH NODES: Normal.    VASCULATURE: Normal.    PELVIC ORGANS: Hysterectomy.    MUSCULOSKELETAL: Left hip arthroplasty.      Impression    IMPRESSION:   1.  The gallbladder is distended with pericholecystic edema. The findings are typical of acute cholecystitis.

## 2025-06-17 NOTE — DISCHARGE INSTRUCTIONS
[facility]  Discharge Instructions: Post-Operative Cholecystectomy    ACTIVITY  Expect to feel tired after your surgery.  This will gradually resolve.    Take frequent, short walks and increase your activity gradually.    Avoid strenuous physical activity or heavy lifting greater than 15-20 lbs. for 2-3 weeks.  You may climb stairs.  You may drive without restrictions when you are not using any prescription pain medication and feel comfortable in a car.  You may return to work/school when you are comfortable without any prescription pain medication.    WOUND CARE  You may remove your outer dressing or Band-Aids and shower 48 hours after the surgery.  Pat your incisions dry and leave them open to air.  Re-apply dressing (Band-Aids or gauze/tape) as needed for comfort or drainage.  You may have steri-strips (looks like white tape) on your incision.  You may peel off the steri-strips 2 weeks after your surgery if they have not peeled off on their own.  If you have skin glue, it will peel up and fall off on its own.  Do not soak your incisions in a tub or pool for 2 weeks.   Do not apply any lotions, creams, or ointments to your incisions.  A ridge under your incisions is normal and will gradually resolve.    DIET  Start with liquids, then gradually resume your regular diet as tolerated.  Avoid heavy, spicy, and greasy meals for 2-3 days.  Drink plenty of fluids to stay hydrated.  It is not uncommon to experience some loose stools or diarrhea after surgery.  This is your body's way of adapting to the bile which will slowly drain into your intestine.  A low fat diet may help with this.  This should improve over 1-2 months.    PAIN  Expect some tenderness and discomfort at the incision sites.  Use the prescribed pain medication at your discretion.  Expect gradual resolution of your pain over several days.  You may take ibuprofen with food (unless you have been told not to) or  acetaminophen/Tylenol instead of or in addition to your prescribed pain medication.  However, if you are taking Norco or Percocet, do not take any additional acetaminophen/Tylenol.  Do not drink alcohol or drive while you are taking pain medications.  You may apply ice to your incisions in 20 minute intervals as needed for the next 48 hours.  After that time, consider switching to heat if you prefer.    EXPECTATIONS  Pain medications can cause constipation.  Limit use when possible.  Take an over the counter or prescribed stool softener/stimulant, such as Colace or Senna, 1-2 times a day with plenty of water.  You may take a mild over the counter laxative, such as Miralax or a suppository, as needed.  You may discontinue these medications once you are having regular bowel movements and/or are no longer taking your narcotic pain medication.    You may have shoulder or upper back discomfort due to the gas used in surgery.  This is temporary and should resolve in 48-72 hours.  Short, frequent walks may help with this.  If you are unable to urinate for 8 hours or feel as though you are not emptying your bladder adequately, we recommend you seek care at an ER or Urgent Care facility for possible catheter placement.     FOLLOW UP  Our office will contact you in approximately 2-3 weeks to check on your progress and answer any questions you may have.  If you are doing well, you will not need to return for a follow up appointment.  If any concerns are identified over the phone, we will help you make an appointment to see a provider.   If you have not received a phone call, have any questions or concerns, or would like to be seen, please call us at [phone] and ask to speak with our nurse.  We are located at [address] [place] [address].    CALL OUR OFFICE -[phone] IF YOU HAVE:   Chills or fever above 101 F.  Increased redness, warmth, or drainage at your incisions.  Significant  bleeding.  Pain not relieved by your pain medication or rest.  Increasing pain after the first 48 hours.  Any other concerns or questions.                        Revised October 2022

## 2025-06-17 NOTE — ANESTHESIA PROCEDURE NOTES
Airway       Patient location during procedure: OR       Procedure Start/Stop Times: 6/17/2025 10:38 AM  Staff -        CRNA: Mattie Morton APRN CRNA       Performed By: CRNAIndications and Patient Condition       Indications for airway management: josue-procedural       Induction type:intravenous       Mask difficulty assessment: 2 - vent by mask + OA or adjuvant +/- NMBA    Final Airway Details       Final airway type: endotracheal airway       Successful airway: ETT - single  Endotracheal Airway Details        ETT size (mm): 7.0       Cuffed: yes       Successful intubation technique: video laryngoscopy       VL Blade Size: Kelsey 3       Grade View of Cords: 1       Adjucts: stylet       Position: Right       Measured from: lips       Secured at (cm): 21       Bite block used: None    Post intubation assessment        Placement verified by: capnometry, equal breath sounds and chest rise        Number of attempts at approach: 1       Secured with: tape       Ease of procedure: easy       Dentition: Intact and Unchanged    Medication(s) Administered   Medication Administration Time: 6/17/2025 10:38 AM

## 2025-06-17 NOTE — ED NOTES
"[facility]  ED Nurse Handoff Report    ED Chief complaint: Nausea & Vomiting      ED Diagnosis:   Final diagnoses:   Pain of upper abdomen - Gastric and right upper quadrant   Gallbladder pain   Nausea and vomiting, unspecified vomiting type   SHAUN (acute kidney injury)   Leukocytosis, unspecified type       Code Status: To be addressed by admitting provider    Allergies:   Allergies   Allergen Reactions    Septra [Sulfamethoxazole-Trimethoprim]     Sulfa Antibiotics Hives       Patient Story: Pt came from home, reports N/V X 5 days. Unable to keep food down. Denies changes in bowel movements. Generalized weakness.  Focused Assessment:  Alert and oriented X 4, pleasant and cooperative with cares. WBC elevated, see ultrasound results. CT pending. IV abx started, BC's pending.     Treatments and/or interventions provided: Labs, imaging, meds (see MAR)  Patient's response to treatments and/or interventions: TBD    To be done/followed up on inpatient unit:  Admission orders    Does this patient have any cognitive concerns?: Alert and oriented X 4    Activity level - Baseline/Home:  Independent  Activity Level - Current:   Independent    Patient's Preferred language: English   Needed?: No    Isolation: None  Infection: Not Applicable  Sepsis treatment initiated: No  Patient tested for COVID 19 prior to admission: NO  Bariatric?: No    Vital Signs:   Vitals:    06/16/25 1141   BP: 110/71   Pulse: 91   Resp: 18   Temp: (!) 96  F (35.6  C)   TempSrc: Temporal   SpO2: 99%   Weight: 92.1 kg (203 lb)   Height: 1.626 m (5' 4\")       Cardiac Rhythm:     Was the PSS-3 completed:   Yes  What interventions are required if any?               Family Comments: Pt notified family  OBS brochure/video discussed/provided to patient/family: N/A              Name of person given brochure if not patient: na              Relationship to patient: na    For the majority of the shift this patient's behavior was Green. "   Behavioral interventions performed were na.    ED NURSE PHONE NUMBER: [phone]

## 2025-06-17 NOTE — ANESTHESIA POSTPROCEDURE EVALUATION
Patient: Farhana Michael    Procedure: Procedure(s):  CHOLECYSTECTOMY, LAPAROSCOPIC       Anesthesia Type:  General    Note:  Disposition: Inpatient   Postop Pain Control: Uneventful            Sign Out: Well controlled pain   PONV: No   Neuro/Psych: Uneventful            Sign Out: Acceptable/Baseline neuro status   Airway/Respiratory: Uneventful            Sign Out: Acceptable/Baseline resp. status   CV/Hemodynamics: Uneventful            Sign Out: Acceptable CV status; No obvious hypovolemia; No obvious fluid overload   Other NRE:    DID A NON-ROUTINE EVENT OCCUR?        Last vitals:  Vitals Value Taken Time   /78 06/17/25 13:00   Temp 36.7  C (98.1  F) 06/17/25 12:45   Pulse 85 06/17/25 13:04   Resp 22 06/17/25 13:04   SpO2 98 % 06/17/25 13:03   Vitals shown include unfiled device data.    Electronically Signed By: Clari Tirado MD  June 17, 2025  1:31 PM

## 2025-06-18 VITALS
TEMPERATURE: 98.9 F | BODY MASS INDEX: 35.56 KG/M2 | HEIGHT: 64 IN | SYSTOLIC BLOOD PRESSURE: 106 MMHG | RESPIRATION RATE: 20 BRPM | DIASTOLIC BLOOD PRESSURE: 72 MMHG | WEIGHT: 208.3 LBS | HEART RATE: 72 BPM | OXYGEN SATURATION: 96 %

## 2025-06-18 LAB
ANION GAP SERPL CALCULATED.3IONS-SCNC: 12 MMOL/L (ref 7–15)
BASOPHILS # BLD MANUAL: 0 10E3/UL (ref 0–0.2)
BASOPHILS NFR BLD MANUAL: 0 %
BUN SERPL-MCNC: 17.6 MG/DL (ref 8–23)
CALCIUM SERPL-MCNC: 9 MG/DL (ref 8.8–10.4)
CHLORIDE SERPL-SCNC: 100 MMOL/L (ref 98–107)
CREAT SERPL-MCNC: 0.89 MG/DL (ref 0.51–0.95)
EGFRCR SERPLBLD CKD-EPI 2021: 71 ML/MIN/1.73M2
EOSINOPHIL # BLD MANUAL: 0 10E3/UL (ref 0–0.7)
EOSINOPHIL NFR BLD MANUAL: 0 %
ERYTHROCYTE [DISTWIDTH] IN BLOOD BY AUTOMATED COUNT: 14.6 % (ref 10–15)
GLUCOSE SERPL-MCNC: 120 MG/DL (ref 70–99)
HCO3 SERPL-SCNC: 25 MMOL/L (ref 22–29)
HCT VFR BLD AUTO: 30.6 % (ref 35–47)
HGB BLD-MCNC: 10.1 G/DL (ref 11.7–15.7)
LYMPHOCYTES # BLD MANUAL: 1.6 10E3/UL (ref 0.8–5.3)
LYMPHOCYTES NFR BLD MANUAL: 6 %
MAGNESIUM SERPL-MCNC: 2.5 MG/DL (ref 1.7–2.3)
MCH RBC QN AUTO: 28.6 PG (ref 26.5–33)
MCHC RBC AUTO-ENTMCNC: 33 G/DL (ref 31.5–36.5)
MCV RBC AUTO: 87 FL (ref 78–100)
MONOCYTES # BLD MANUAL: 2.9 10E3/UL (ref 0–1.3)
MONOCYTES NFR BLD MANUAL: 11 %
NEUTROPHILS # BLD MANUAL: 21.7 10E3/UL (ref 1.6–8.3)
NEUTROPHILS NFR BLD MANUAL: 83 %
PATH REV: ABNORMAL
PLATELET # BLD AUTO: 267 10E3/UL (ref 150–450)
POTASSIUM SERPL-SCNC: 3.8 MMOL/L (ref 3.4–5.3)
RBC # BLD AUTO: 3.53 10E6/UL (ref 3.8–5.2)
SODIUM SERPL-SCNC: 137 MMOL/L (ref 135–145)
WBC # BLD AUTO: 15.1 10E3/UL (ref 4–11)

## 2025-06-18 PROCEDURE — 80048 BASIC METABOLIC PNL TOTAL CA: CPT | Performed by: HOSPITALIST

## 2025-06-18 PROCEDURE — 83735 ASSAY OF MAGNESIUM: CPT | Performed by: HOSPITALIST

## 2025-06-18 PROCEDURE — 250N000011 HC RX IP 250 OP 636: Performed by: STUDENT IN AN ORGANIZED HEALTH CARE EDUCATION/TRAINING PROGRAM

## 2025-06-18 PROCEDURE — 250N000013 HC RX MED GY IP 250 OP 250 PS 637: Performed by: STUDENT IN AN ORGANIZED HEALTH CARE EDUCATION/TRAINING PROGRAM

## 2025-06-18 PROCEDURE — 36415 COLL VENOUS BLD VENIPUNCTURE: CPT | Performed by: HOSPITALIST

## 2025-06-18 PROCEDURE — 258N000003 HC RX IP 258 OP 636: Performed by: STUDENT IN AN ORGANIZED HEALTH CARE EDUCATION/TRAINING PROGRAM

## 2025-06-18 PROCEDURE — 99239 HOSP IP/OBS DSCHRG MGMT >30: CPT | Performed by: HOSPITALIST

## 2025-06-18 PROCEDURE — 85018 HEMOGLOBIN: CPT | Performed by: HOSPITALIST

## 2025-06-18 RX ORDER — PIPERACILLIN SODIUM, TAZOBACTAM SODIUM 3; .375 G/15ML; G/15ML
3.38 INJECTION, POWDER, LYOPHILIZED, FOR SOLUTION INTRAVENOUS ONCE
Status: COMPLETED | OUTPATIENT
Start: 2025-06-18 | End: 2025-06-18

## 2025-06-18 RX ORDER — AMOXICILLIN 250 MG
1 CAPSULE ORAL 2 TIMES DAILY
Status: DISCONTINUED | OUTPATIENT
Start: 2025-06-18 | End: 2025-06-18 | Stop reason: HOSPADM

## 2025-06-18 RX ORDER — ACETAMINOPHEN 325 MG/1
650 TABLET ORAL EVERY 4 HOURS PRN
COMMUNITY
Start: 2025-06-18

## 2025-06-18 RX ORDER — POLYETHYLENE GLYCOL 3350 17 G/17G
17 POWDER, FOR SOLUTION ORAL DAILY
Status: DISCONTINUED | OUTPATIENT
Start: 2025-06-18 | End: 2025-06-18 | Stop reason: HOSPADM

## 2025-06-18 RX ORDER — AMOXICILLIN 250 MG
2 CAPSULE ORAL 2 TIMES DAILY
Status: DISCONTINUED | OUTPATIENT
Start: 2025-06-18 | End: 2025-06-18 | Stop reason: HOSPADM

## 2025-06-18 RX ADMIN — SENNOSIDES AND DOCUSATE SODIUM 1 TABLET: 50; 8.6 TABLET ORAL at 09:19

## 2025-06-18 RX ADMIN — PIPERACILLIN AND TAZOBACTAM 3.38 G: 3; .375 INJECTION, POWDER, FOR SOLUTION INTRAVENOUS at 09:13

## 2025-06-18 RX ADMIN — ACETAMINOPHEN 650 MG: 325 TABLET, FILM COATED ORAL at 05:42

## 2025-06-18 RX ADMIN — PIPERACILLIN AND TAZOBACTAM 3.38 G: 3; .375 INJECTION, POWDER, FOR SOLUTION INTRAVENOUS at 02:10

## 2025-06-18 RX ADMIN — ACETAMINOPHEN 650 MG: 325 TABLET, FILM COATED ORAL at 11:48

## 2025-06-18 RX ADMIN — SODIUM CHLORIDE, SODIUM LACTATE, POTASSIUM CHLORIDE, AND CALCIUM CHLORIDE: .6; .31; .03; .02 INJECTION, SOLUTION INTRAVENOUS at 00:57

## 2025-06-18 ASSESSMENT — ACTIVITIES OF DAILY LIVING (ADL)
ADLS_ACUITY_SCORE: 34
ADLS_ACUITY_SCORE: 33
ADLS_ACUITY_SCORE: 34
ADLS_ACUITY_SCORE: 33
ADLS_ACUITY_SCORE: 33
ADLS_ACUITY_SCORE: 34
ADLS_ACUITY_SCORE: 33
ADLS_ACUITY_SCORE: 33
ADLS_ACUITY_SCORE: 34
ADLS_ACUITY_SCORE: 33

## 2025-06-18 NOTE — PLAN OF CARE
Summary: Presented with nausea and vomiting x 5 days. Findings on abdominal CT typical of Acute Cholecystitis. Lap Cholecystectomy 6/17/25     DATE & TIME: 06/17/25, 1930 - 0730     Cognitive Concerns/ Orientation: A&O x 4   BEHAVIOR & AGGRESSION TOOL COLOR: Green  ABNL VS/O2: VSS on room air   MOBILITY: Independent this AM.  gait steady. Denied lightheadedness/dizziness  PAIN MANAGEMENT: Prn Tylenol x 2 given for pain to abdominal incisional sites  DIET: Clears, patient not quite ready to advance to regular diet  BOWEL/BLADDER: Continent of bladder. No BM this shift  ABNL LAB/BG: AM labs pending   DRAIN/DEVICES: Need new PIV. PIV to hand dislodged. PIV to R AC leaking, removed  SKIN: Bruising to left forearm. Steri-strips and band aids to abdominal Incisional sites remained CDI   TESTS/PROCEDURES: Lap Claire done  6/17/25   D/C DAY/GOALS/PLACE: Pending  OTHER IMPORTANT INFO: General Surgery following.       Goal Outcome Evaluation:      Plan of Care Reviewed With: patient    Overall Patient Progress: improvingOverall Patient Progress: improving

## 2025-06-18 NOTE — DISCHARGE SUMMARY
"[facility]  Hospitalist Discharge Summary      Date of Admission:  6/16/2025  Date of Discharge:  6/18/2025  Discharging Provider: Colin Garay MD  Discharge Service: Hospitalist Service    Discharge Diagnoses   Acute cholecystitis  Possible UTI, ruled out  Acute kidney injury  Metabolic alkalosis  Hx of endometrial carcinoma s/p hysterectomy in 2007  Hypertension  Severe obesity with hypertension    Clinically Significant Risk Factors     # Obesity: Estimated body mass index is 35.75 kg/m  as calculated from the following:    Height as of this encounter: 1.626 m (5' 4\").    Weight as of this encounter: 94.5 kg (208 lb 4.8 oz).       Follow-ups Needed After Discharge   Follow-up Appointments       Hospital Follow-up with Existing Primary Care Provider (PCP)          Schedule Primary Care visit within: 14 Days   Recommended labs and Imaging (to be ordered by Primary Care Provider): BMP             Unresulted Labs Ordered in the Past 30 Days of this Admission       Date and Time Order Name Status Description    6/17/2025 11:40 AM Surgical Pathology Exam In process     6/16/2025  1:42 PM Blood Culture Peripheral blood (BC) Arm, Left Preliminary     6/16/2025  1:42 PM Blood Culture Peripheral blood (BC) Hand, Left Preliminary         These results will be followed up by Pathology - General Surgery; Blood cultures - Hospitalist Service.    Discharge Disposition   Discharged to home  Condition at discharge: Stable    Hospital Course   Farhana Michael is a 69 year old female admitted on 6/16/2025. She presented with nausea and vomiting for 5 days. ED evaluation was consistent with acute cholecystitis. She was admitted to the hospitalist service for further evaluation and management.    Acute cholecystitis  Ultrasound with distended gallbladder but no obvious cholecystitis.  CT abdomen with e/o cholecystitis.  Admitted to inpatient.  General surgery consulted, appreciate their assistance.  S/p " laparoscopic cholecystectomy on 6/17/25.  Post-op management as directed by general surgery.  Tolerating oral intake.  Ambulating.  Minimal pain.  Feels ready for discharge home.  Discharge home today.  Follow up with PCP in 1-2 weeks with labs.  Follow up with general surgery as directed in AVS.  Treated with IV Zosyn while in the hospital, will transition to Augmentin upon discharge to complete a 5 day course of antibiotics as recommended by general surgery.  Discussed reasons to seek medical attention prior to follow up appointment.    Possible UTI, ruled out  No clear urinary symptoms, and ua with significant squamous cells, but UA does otherwise appear infected.  Urine culture grew 10,000-50,000 cfu/ml of mixed urogenital jazz.  UTI ruled out but she is getting antibiotics for acute cholecystitis as noted above.    Acute kidney injury  Metabolic alkalosis  Likely prerenal from poor oral intake.  Creatinine improved with IV fluids, down to 0.89 on 6/18/25.  Recheck BMP at follow up with PCP.    Hx of endometrial carcinoma s/p hysterectomy in 2007  Noted.    Hypertension  PTA lisinopril and triamterene-hydrochlorothiazide were hold for now in the setting of SHAUN.  SHAUN has resolved, but blood pressure remains relatively low without PTA medications.  Continue to hold lisinopril and triamterene-hydrochlorothiazide upon discharge. Monitor blood pressure daily and resume PTA medications when systolic blood pressure greater than 120 mmHg.    Severe obesity with hypertension  Body mass index is 35.75 kg/m .  Follow up with primary care provider regarding long term management.    Consultations This Hospital Stay   SURGERY GENERAL IP CONSULT    Code Status   Full Code    Time Spent on this Encounter   I, Colin Garay MD, personally saw the patient today and spent greater than 30 minutes discharging this patient.       Colin Garay MD  [place] [address] [facility]  [address]  [place] [address]  Phone: [phone]  ______________________________________________________________________    Physical Exam   Vital Signs: Temp: 98.9  F (37.2  C) Temp src: Oral BP: 106/72 Pulse: 72   Resp: 18 SpO2: 96 % O2 Device: None (Room air) Oxygen Delivery: 2 LPM  Weight: 208 lbs 4.8 oz  Constitutional: awake, alert, cooperative, no apparent distress, sitting up in a chair  Respiratory: no increased work of breathing, clear to auscultation bilaterally, no crackles or wheezing  Cardiovascular: regular rate and rhythm, normal S1 and S2, no murmur noted  GI: normal bowel sounds, soft, non-distended, appropriate tenderness  Skin: warm, dry  Musculoskeletal: no lower extremity pitting edema present  Neurologic: awake, alert, answers questions appropriately, moves all extremities       Primary Care Physician   FREEDOM NICOLAS    Discharge Orders      Reason for your hospital stay    Acute cholecystitis     Activity    Your activity upon discharge: activity as directed by general surgery     Diet    Follow this diet upon discharge: as directed by general surgery     Hospital Follow-up with Existing Primary Care Provider (PCP)          Significant Results and Procedures   Most Recent 3 CBC's:  Recent Labs   Lab Test 06/18/25  0705 06/17/25  0644 06/16/25  1312   WBC 15.1* 19.8* 26.2*   HGB 10.1* 10.7* 12.8   MCV 87 88 85    237 311     Most Recent 3 BMP's:  Recent Labs   Lab Test 06/18/25  0705 06/17/25  0644 06/16/25  1312    132* 131*   POTASSIUM 3.8 3.6 3.6   CHLORIDE 100 92* 89*   CO2 25 26 27   BUN 17.6 19.7 14.4   CR 0.89 1.44* 1.56*   ANIONGAP 12 14 15   ANGELITA 9.0 8.9 9.7   * 102* 131*     Most Recent 2 LFT's:  Recent Labs   Lab Test 06/16/25  1312   AST 18   ALT 20   ALKPHOS 136   BILITOTAL 0.6     Results for orders placed or performed during the hospital encounter of 06/16/25   US Abdomen Limited    Narrative    EXAM: US ABDOMEN LIMITED  LOCATION: [facility]  HOSPITAL  DATE: 6/16/2025    INDICATION: RUQ pain with N V  COMPARISON: Limited abdominal sonogram 05/16/2008  TECHNIQUE: Limited abdominal ultrasound.    FINDINGS:    GALLBLADDER: The gallbladder is distended, measuring approximately 9.8 x 5.5 x 5.3 cm. No gallbladder wall thickening pericholecystic fluid or focal tenderness to transducer palpation of the gallbladder.    BILE DUCTS: No biliary dilatation. The common duct measures 5 mm.    LIVER: Increased echogenicity from diffuse fatty infiltration with suspected focal fatty sparing about the gallbladder. Limited evaluation of the liver with incomplete visualization. No focal mass; reported previously seen subcentimeter lesion on prior   CT examination was not seen on the current sonogram (2009, images unavailable for comparison at time of dictation). The portal vein is patent with flow in the normal direction.    RIGHT KIDNEY: No hydronephrosis.    PANCREAS: The head appears normal; the body and tail are not well seen secondary to overlying bowel gas.    No ascites.      Impression    IMPRESSION:  1.  Distended gallbladder without cholelithiasis or findings of acute cholecystitis. No biliary ductal dilation.  2.  Hepatic steatosis. No focal hepatic lesion identified; however, the liver is incompletely evaluated secondary to limited visualization from hepatic steatosis and body habitus.       CT Abdomen Pelvis w Contrast    Narrative    EXAM: CT ABDOMEN PELVIS W CONTRAST  LOCATION: [facility]  DATE: 6/16/2025    INDICATION: Abdominal pain, white count of 26,000, distended gallbladder on ultrasound.  Rule out other causes for elevated white count and nausea and vomiting  COMPARISON: Ultrasound abdomen 6/16/2025. 9/15/2010    TECHNIQUE: CT scan of the abdomen and pelvis was performed following injection of IV contrast. Multiplanar reformats were obtained. Dose reduction techniques were used.  CONTRAST: 102mL isovue 370    FINDINGS:   LOWER  CHEST: Normal.    HEPATOBILIARY: The gallbladder is distended. Pericholecystic edema and inflammation. Findings would suggest acute cholecystitis. No bile duct dilatation is seen.    PANCREAS: Normal.    SPLEEN: Normal.    ADRENAL GLANDS: Normal.    KIDNEYS/BLADDER: Normal.    BOWEL: Duodenal diverticulum. Colonic diverticula without evidence for diverticulitis. Small hiatal hernia.    LYMPH NODES: Normal.    VASCULATURE: Normal.    PELVIC ORGANS: Hysterectomy.    MUSCULOSKELETAL: Left hip arthroplasty.      Impression    IMPRESSION:   1.  The gallbladder is distended with pericholecystic edema. The findings are typical of acute cholecystitis.     Discharge Medications      Review of your medicines        PAUSE taking these medications        Dose / Directions   lisinopril 2.5 MG tablet  Wait to take this until your doctor or other care provider tells you to start again.  Can resume once systolic blood pressure (top number) is consistently greater than 120 mmHg.  Commonly known as: ZESTRIL      Dose: 1 tablet  Take 1 tablet by mouth daily.  Refills: 0     triamterene-HCTZ 37.5-25 MG tablet  Wait to take this until your doctor or other care provider tells you to start again.  Can resume once systolic blood pressure (top number) is consistently greater than 120 mmHg.  Commonly known as: MAXZIDE-25      Dose: 1 tablet  Take 1 tablet by mouth daily.  Refills: 0            START taking        Dose / Directions   acetaminophen 325 MG tablet  Commonly known as: TYLENOL  Used for: Acute cholecystitis, Acute post-operative pain      Dose: 650 mg  Take 2 tablets (650 mg) by mouth every 4 hours as needed for mild pain.  Refills: 0     amoxicillin-clavulanate 875-125 MG tablet  Commonly known as: AUGMENTIN  Indication: Preventative Medication Therapy Used Around Surgery  Used for: Acute cholecystitis      Dose: 1 tablet  Take 1 tablet by mouth every 12 hours for 5 days.  Quantity: 9 tablet  Refills: 0     senna-docusate 8.6-50  MG tablet  Commonly known as: SENOKOT-S/PERICOLACE  Used for: Acute post-operative pain      Dose: 1 tablet  Take 1 tablet by mouth 2 times daily.  Quantity: 15 tablet  Refills: 0            CONTINUE these medicines which have NOT CHANGED        Dose / Directions   amoxicillin 500 MG capsule  Commonly known as: AMOXIL      Take 4 capsules 1 hour prior to any dental procedure, including routine cleaning.  Refills: 0     clindamycin 1 % external lotion  Commonly known as: CLEOCIN T      APPLY TOPICALLY TO FACE DAILY  Refills: 0     metroNIDAZOLE 0.75 % external cream  Commonly known as: METROCREAM      Apply topically 2 times daily  Refills: 0     omeprazole 20 MG DR capsule  Commonly known as: PriLOSEC      TAKE 1 CAPSULE BY MOUTH DAILY AS NEEDED FOR STOMACH ACID OR REFLUX  Refills: 0     potassium chloride kwan ER 20 MEQ CR tablet  Commonly known as: KLOR-CON M20      Dose: 20 mEq  Take 20 mEq by mouth daily.  Refills: 0               Where to get your medicines        These medications were sent to [facility]  [facility] - [address] [address]  [address] [address] [address]      Phone: [phone]   amoxicillin-clavulanate 875-125 MG tablet  senna-docusate 8.6-50 MG tablet       Some of these will need a paper prescription and others can be bought over the counter. Ask your nurse if you have questions.    You don't need a prescription for these medications  acetaminophen 325 MG tablet       Allergies   Allergies   Allergen Reactions    Septra [Sulfamethoxazole-Trimethoprim]     Sulfa Antibiotics Hives

## 2025-06-18 NOTE — PLAN OF CARE
Discharge    Patient discharged to home via wheelchair with daughter picking her up at [address].     Care plan note    Summary: Presented with nausea and vomiting x 5 days. Findings on abdominal CT typical of Acute Cholecystitis. Lap Cholecystectomy 6/17/25.     DATE & TIME: 06/18/25, [id number]                        Cognitive Concerns/ Orientation: A & O x 4  BEHAVIOR & AGGRESSION TOOL COLOR: Green  ABNL VS/O2: VSS on RA  MOBILITY: Independent   PAIN MANAGEMENT: PRN Tylenol x 1 given for pain to abdominal incisional sites  DIET: Ate small amount of Regular (solids) prior to discharge. Discussed and gave pt education on low fat diet.   BOWEL/BLADDER: Continent of bladder. Passing flatus after senokot this morning. No BMs  ABNL LAB/BG: Mg 2.5, WBC 15.1, Hgb 10.1, and .   DRAIN/DEVICES: PIV placed for one dose of IV Zosyn this morning. Then removed for discharge.  SKIN: Bruising to L & R forearm. Steri-strips and band aids to abdominal Incisional sites remained CDI   TESTS/PROCEDURES: Lap Claire done  6/17/25   D/C DAY/GOALS/PLACE: This afternoon.   OTHER IMPORTANT INFO: General Surgery following. Pt feeling better this morning. Labs improved. No nausea.           Listed belongings gathered and given to patient (including from security/pharmacy). Yes  Care Plan and Patient education resolved: Yes  Prescriptions if needed, hard copies sent with patient  NA  Medication Bin checked and emptied on discharge Yes  SW/care coordinator/charge RN aware of discharge: Yes

## 2025-06-18 NOTE — PROGRESS NOTES
"[facility]  GENERAL SURGERY Progress Note    Admission Date: 2025         Assessment and Plan:     Farhana Michael is a 69 year old female S/P Procedure(s):  CHOLECYSTECTOMY, LAPAROSCOPIC, 1 Day Post-Op. Procedure notable for pus within the gallbladder with some controlled spillage.     - regular diet   - encourage ambulation   - pain control, patient does not want prescription for oxycodone at home. This was cancelled.   - bowel regimen   - one more dose of Zosyn this morning, then transition to Augmentin. 5 day course of post op Augmentin ordered for home   - okay to discharge later today from surgery perspective                Interval History:     Feels so much better today. Minimal to no pain. Not hungry but no nausea/vomiting. Tolerating clears. No flatus or BM yet. Looking forward to ambulating this morning and hopefully discharge later today.                      Physical Exam:   Blood pressure 97/66, pulse 76, temperature 98.5  F (36.9  C), temperature source Oral, resp. rate 18, height 1.626 m (5' 4\"), weight 94.5 kg (208 lb 4.8 oz), SpO2 95%, not currently breastfeeding.  Temperature Temp  Av.9  F (37.2  C)  Min: 98.1  F (36.7  C)  Max: 101.3  F (38.5  C)   I/O last 3 completed shifts:  In: 2596.67 [P.O.:100; I.V.:2496.67]  Out: 2625 [Urine:2600; Blood:25]  Abdomen: soft, nondistended, nontender. Bandaids clean/dry/intact, no surrounding erythema around incisions. No drainage.        Data:     Recent Labs   Lab Test 25  0705 25  0644 25  1312   WBC 15.1* 19.8* 26.2*   HGB 10.1* 10.7* 12.8   HCT 30.6* 32.6* 37.9    237 311      Recent Labs   Lab Test 25  0705 25  0644 25  1312    132* 131*   POTASSIUM 3.8 3.6 3.6   CHLORIDE 100 92* 89*   CO2 25 26 27   BUN 17.6 19.7 14.4   CR 0.89 1.44* 1.56*     Recent Labs   Lab Test 25  1312   BILITOTAL 0.6   ALT 20   AST 18   ALKPHOS 136   LIPASE 10*      No lab results " found.  Recent Labs   Lab Test 06/18/25  0705 06/17/25  0644 06/16/25  2124 06/16/25  1312   ANGELITA 9.0 8.9  --  9.7   MAG 2.5* 2.1 1.9  --      Recent Labs   Lab Test 06/18/25  0705 06/17/25  0644 06/16/25  1417 06/16/25  1312   ANIONGAP 12 14  --  15   PROTEIN  --   --  30*  --    ALBUMIN  --   --   --  4.0     Manuel Ibrahim MD PGY-4  General Surgery   [phone]

## 2025-06-18 NOTE — PLAN OF CARE
Summary: Presented with nausea and vomiting x 5 days. Findings on abdominal CT typical of Acute Cholecystitis. Lap Jia this morning.     DATE & TIME: 06/17/25, [id number]     Cognitive Concerns/ Orientation: A & O x 4   BEHAVIOR & AGGRESSION TOOL COLOR: Green  ABNL VS/O2: VSS on 2 L nasal cannula post op.   MOBILITY: SBA w/ cane.   PAIN MANAGEMENT: C/O of abdmoinal pain after surgery. PRN Tylenol x 1. Pt trying to avoid narcotics. Cold packs applied.   DIET: Tolerated some clears, did not feel ready for food. No nausea.   BOWEL/BLADDER: Continent, urinated x 2.   ABNL LAB/BG: Sodium 132, Cr 1.44, WBC 19.8, and Hgb 10.7.   DRAIN/DEVICES: 2 PIV, one SL and other infusing LR @ 100 mL/hr.   SKIN: 4 Lap jia sites, w/ steri strips and band aids. CDI.   TESTS/PROCEDURES: Lap Jia completed this morning. None other scheduled.   D/C DAY/GOALS/PLACE: Pending  OTHER IMPORTANT INFO: General Surgery following. IV Zosyn. Abdomen rounded.

## 2025-06-18 NOTE — OP NOTE
Surgeon: Antony Zuleta MD  1st Assistant: Dayday Ibrahim MD. necessary for their expertise in camera management, suctioning, suturing, and retraction.  PREOPERATIVE DIAGNOSIS: Acute on chronic cholecystitis.   POSTOPERATIVE DIAGNOSES: Same  PROCEDURE: Laparoscopic cholecystectomy.  (D2 secondary to significant inflammatory changes)  ANESTHESIA: General.   ESTIMATED BLOOD LOSS: 25 mL.   OPERATIVE PROCEDURE: After induction of general endotracheal anesthesia, Farhana M Alec abdomen was prepped and draped in the usual sterile fashion. With direct vision technique in the left upper quadrant, the abdomen was entered and pneumoperitoneum was established. Three additional 5 mm trocars were placed along the mid abdomen.  Multiple inflammatory adhesions from the omentum, duodenum, and stomach need to be detached from the gallbladder.  The gallbladder itself was showing changes of ischemia and overall very thin wall.  Eventually the gallbladder fundus was retracted cephalad and lateral and the infundibulum was retracted caudad and lateral. The peritoneum investing the triangle of Calot was incised with electrocautery and dissected bluntly. The critical view of a single cystic duct, single cystic artery was achieved.  Of this dissection took 2-3 times as customary, due to the severity of the inflammatory changes.  The cystic artery was appropriately clipped and transected.  The cystic duct was transected and controlled with a firing of the GENE stapler.  The gallbladder which was full with pus and some debris was detached from the liver with electrocautery and blunt dissection. The specimen was removed from the patient's abdomen and sent to pathology. The gallbladder fossa was inspected. Hemostasis was secured, and no evidence of bile leakage noted.  The abdomen was irrigated until effluent was clear and free of debris.  The abdomen was surveyed and no other pathology seen. The trocars were then removed under direct  visualization without evidence of bleeding. Periumbilical and left upper quadrant ports were closed with multiple interrupted 0 Vicryl suture. Skin was approximated with absorbable sutures. Steri-Strips and sterile dressing applied. No immediate complications.   ELISABETH TELLES MD

## 2025-06-19 LAB
BACTERIA SPEC CULT: NORMAL
BACTERIA SPEC CULT: NORMAL

## 2025-06-21 LAB
BACTERIA SPEC CULT: NO GROWTH
BACTERIA SPEC CULT: NO GROWTH

## 2025-06-23 LAB
PATH REPORT.COMMENTS IMP SPEC: NORMAL
PATH REPORT.COMMENTS IMP SPEC: NORMAL
PATH REPORT.FINAL DX SPEC: NORMAL
PATH REPORT.GROSS SPEC: NORMAL
PATH REPORT.MICROSCOPIC SPEC OTHER STN: NORMAL
PATH REPORT.RELEVANT HX SPEC: NORMAL
PHOTO IMAGE: NORMAL

## 2025-06-23 PROCEDURE — 88304 TISSUE EXAM BY PATHOLOGIST: CPT | Mod: 26 | Performed by: PATHOLOGY

## 2025-07-02 ENCOUNTER — TELEPHONE (OUTPATIENT)
Dept: SURGERY | Facility: CLINIC | Age: 70
End: 2025-07-02
Payer: COMMERCIAL

## 2025-07-02 NOTE — TELEPHONE ENCOUNTER
[facility]  Post op call note     Farhana Michael was called for an update regarding her recovery.  She underwent a laparoscopic cholecystectomy by Dr. Zuleta on 6/17/25.  Today she tells me she is doing better.  She reports having diarrhea but states it has resolved after she stopped taking Senna a day ago.  I recommend she monitor her symptoms for now and seek medical care if her symptoms worsen or persist.  She states her wounds are healing well.  The pathology revealed:  SEVERE ACUTE AND CHRONIC CHOLECYSTITIS, FOCALLY GANGRENOUS, WITH INTRAMURAL ABSCESSES.  This was discussed with the patient.  She was instructed to remove steri strips after their next shower and gradually resume any strenuous activity, such as an exercise routine or sporting activity, over the course of a week.  The patient states she understands our discussion and all of her questions were answered.  The patient has a follow up appointment with her PCP tomorrow.  She agrees to follow up in the surgery clinic as needed.      Bibi Palafox PA-C

## (undated) DEVICE — SU MONOCRYL 4-0 PS-2 18" UND Y496G

## (undated) DEVICE — ENDO TROCAR FIRST ENTRY KII FIOS Z-THRD 05X100MM CTF03

## (undated) DEVICE — EVAC SYSTEM CLEAR FLOW SC082500

## (undated) DEVICE — SUCTION MANIFOLD NEPTUNE 2 SYS 4 PORT 0702-020-000

## (undated) DEVICE — LINEN TOWEL PACK X5 5464

## (undated) DEVICE — PACK LAP CHOLE SLC15LCFSD

## (undated) DEVICE — SUCTION IRR STRYKERFLOW II W/TIP 250-070-520

## (undated) DEVICE — ENDO TROCAR SLEEVE KII Z-THREADED 05X100MM CTS02

## (undated) DEVICE — SOL NACL 0.9% INJ 1000ML BAG 2B1324X

## (undated) DEVICE — STPL RELOAD REG TISSUE ECHELON 45 X 3.6MM BLUE GST45B

## (undated) DEVICE — ENDO TROCAR BLUNT TIP KII BALLOON 12X100MM C0R47

## (undated) DEVICE — Device

## (undated) DEVICE — ESU GROUND PAD UNIVERSAL W/O CORD

## (undated) DEVICE — WARMER SCOPE DISPOSABLE DLW510

## (undated) DEVICE — STPL ENDO ARTICULATING 45MM EC45A

## (undated) DEVICE — KIT TURNOVER FAIRVIEW SOUTHDALE FULL SP3889

## (undated) DEVICE — PREP CHLORAPREP 26ML TINTED HI-LITE ORANGE 930815

## (undated) DEVICE — CLIP APPLIER ENDO 5MM M/L LIGAMAX EL5ML

## (undated) DEVICE — GLOVE PROTEXIS BLUE W/NEU-THERA 7.5  2D73EB75

## (undated) DEVICE — SOL WATER IRRIG 1000ML BOTTLE 2F7114

## (undated) DEVICE — ENDO POUCH RETRIEVAL SYSTEM OD11 MM 265 ML CD005

## (undated) DEVICE — ESU HOLDER LAP INST DISP PURPLE LONG 330MM H-PRO-330

## (undated) DEVICE — BAG DECANTER STERILE WHITE DYNJDEC09

## (undated) DEVICE — SU VICRYL+ 0 27 UR6 VLT VCP603H

## (undated) RX ORDER — GLYCOPYRROLATE 0.2 MG/ML
INJECTION, SOLUTION INTRAMUSCULAR; INTRAVENOUS
Status: DISPENSED
Start: 2025-06-17

## (undated) RX ORDER — PROPOFOL 10 MG/ML
INJECTION, EMULSION INTRAVENOUS
Status: DISPENSED
Start: 2025-06-17

## (undated) RX ORDER — CEFAZOLIN SODIUM 1 G/3ML
INJECTION, POWDER, FOR SOLUTION INTRAMUSCULAR; INTRAVENOUS
Status: DISPENSED
Start: 2025-06-17

## (undated) RX ORDER — FENTANYL CITRATE 50 UG/ML
INJECTION, SOLUTION INTRAMUSCULAR; INTRAVENOUS
Status: DISPENSED
Start: 2018-02-08

## (undated) RX ORDER — FENTANYL CITRATE 50 UG/ML
INJECTION, SOLUTION INTRAMUSCULAR; INTRAVENOUS
Status: DISPENSED
Start: 2025-06-17

## (undated) RX ORDER — HYDROMORPHONE HYDROCHLORIDE 1 MG/ML
INJECTION, SOLUTION INTRAMUSCULAR; INTRAVENOUS; SUBCUTANEOUS
Status: DISPENSED
Start: 2025-06-17